# Patient Record
Sex: FEMALE | Race: WHITE | HISPANIC OR LATINO | Employment: OTHER | URBAN - METROPOLITAN AREA
[De-identification: names, ages, dates, MRNs, and addresses within clinical notes are randomized per-mention and may not be internally consistent; named-entity substitution may affect disease eponyms.]

---

## 2024-01-30 ENCOUNTER — OFFICE VISIT (OUTPATIENT)
Dept: SPORTS MEDICINE | Facility: CLINIC | Age: 69
End: 2024-01-30
Payer: MEDICARE

## 2024-01-30 VITALS — HEIGHT: 64 IN | WEIGHT: 178 LBS | BODY MASS INDEX: 30.39 KG/M2

## 2024-01-30 DIAGNOSIS — M75.41 SUBACROMIAL IMPINGEMENT OF RIGHT SHOULDER: ICD-10-CM

## 2024-01-30 DIAGNOSIS — Z98.890 H/O REPAIR OF RIGHT ROTATOR CUFF: ICD-10-CM

## 2024-01-30 DIAGNOSIS — G56.01 CARPAL TUNNEL SYNDROME OF RIGHT WRIST: Primary | ICD-10-CM

## 2024-01-30 DIAGNOSIS — M25.511 RIGHT SHOULDER PAIN, UNSPECIFIED CHRONICITY: Primary | ICD-10-CM

## 2024-01-30 PROCEDURE — 99203 OFFICE O/P NEW LOW 30 MIN: CPT | Mod: PBBFAC,PN | Performed by: STUDENT IN AN ORGANIZED HEALTH CARE EDUCATION/TRAINING PROGRAM

## 2024-01-30 PROCEDURE — 97760 ORTHOTIC MGMT&TRAING 1ST ENC: CPT | Mod: PBBFAC,PN | Performed by: STUDENT IN AN ORGANIZED HEALTH CARE EDUCATION/TRAINING PROGRAM

## 2024-01-30 PROCEDURE — 99999 PR PBB SHADOW E&M-NEW PATIENT-LVL III: CPT | Mod: PBBFAC,,, | Performed by: STUDENT IN AN ORGANIZED HEALTH CARE EDUCATION/TRAINING PROGRAM

## 2024-01-30 PROCEDURE — 99204 OFFICE O/P NEW MOD 45 MIN: CPT | Mod: 25,S$PBB,, | Performed by: STUDENT IN AN ORGANIZED HEALTH CARE EDUCATION/TRAINING PROGRAM

## 2024-01-30 RX ORDER — ATORVASTATIN CALCIUM 40 MG/1
1 TABLET, FILM COATED ORAL DAILY
COMMUNITY
Start: 2023-10-02 | End: 2024-03-06 | Stop reason: SDUPTHER

## 2024-01-30 RX ORDER — CHOLECALCIFEROL (VITAMIN D3) 50 MCG
2000 TABLET ORAL DAILY
COMMUNITY
Start: 2023-11-16

## 2024-01-30 RX ORDER — LANOLIN ALCOHOL/MO/W.PET/CERES
1 CREAM (GRAM) TOPICAL DAILY
COMMUNITY
Start: 2023-11-16

## 2024-01-30 RX ORDER — CETIRIZINE HYDROCHLORIDE 10 MG/1
1 TABLET ORAL DAILY
COMMUNITY
Start: 2023-11-16 | End: 2024-03-06 | Stop reason: SDUPTHER

## 2024-01-30 NOTE — PROGRESS NOTES
"        Patient ID: Pamela Lancaster  YOB: 1955  MRN: [id number]    Chief Complaint: Pain of the Right Shoulder    Referred By: Self  for right shoulder    History of Present Illness: Pamela Lancaster is a right-hand dominant 68 y.o. female who presents today with right shoulder pain.     The patient is active in none.  Occupation: Retired    Pamela Lancaster states it is Acute on chronic in nature and there was not a specific mechanism. Prior history of rotator cuff repair with a recent tingling sensation in tips of finger 2-5.   Pamela Lancaster describes the pain as a intermittent ache and numbness. Current pain level at rest is 6/10, pain level at worst is 9/10 (Numeric Pain Rating Scale).  Associated symptoms include: Swelling No, Instability No, Pain that affects your sleep No, Mechanical Yes, locking/catching No, Neurological Yes, limited range of motion No. Aggravating activities include certain positional movements. They have tried  naproxen, topicals so far for this. They believe that they are unchanged with this treatment.     No results found for: "HGBA1C"    Past Medical History:   History reviewed. No pertinent past medical history.  Past Surgical History:   Procedure Laterality Date    JOINT REPLACEMENT       Family History   Problem Relation Age of Onset    No Known Problems Mother     No Known Problems Father      Social History     Socioeconomic History    Marital status:    Tobacco Use    Smoking status: Never    Smokeless tobacco: Never   Substance and Sexual Activity    Alcohol use: Never    Drug use: Never    Sexual activity: Not Currently     Medication List with Changes/Refills   Current Medications    ATORVASTATIN (LIPITOR) 40 MG TABLET    Take 1 tablet by mouth once daily.    CETIRIZINE (ZYRTEC) 10 MG TABLET    Take 1 tablet by mouth once daily.    CHOLECALCIFEROL, VITAMIN D3, (VITAMIN D3) 50 MCG (2,000 UNIT) TAB    Take 2,000 Units by mouth once daily.    CYANOCOBALAMIN (VITAMIN B-12) " 1000 MCG TABLET    Take 1 tablet by mouth once daily.     Review of patient's allergies indicates:   Allergen Reactions    Iodine     Shellfish containing products        Physical Exam:   Body mass index is 30.55 kg/m².    GENERAL: Well appearing, in no acute distress.  HEAD: Normocephalic and atraumatic.  ENT: External ears and nose grossly normal.  EYES: EOMI bilaterally  PULMONARY: Respirations are grossly even and non-labored.  NEURO: Awake, alert, and oriented x 3.  SKIN: No obvious rashes appreciated.  PSYCH: Mood & affect are appropriate.    Detailed MSK exam:     Full cervical ROM, tightness with flexion. Spurling's negative. Negative tenderness, negative increased tissue density.     Shoulder ROM- full, negative drop arm. IR to T12 bilaterally.   Resisted strength-   Flexion 5/5, ER 5/5, IR 5/5, extension 5/5    Tenderness to palpation: AC negative, long head biceps tendon negative, anterior capsule negative, posterior capsule negative, biceps negative, rotator cuff negative, subacromial space positive    Neers negative, Estrada Estevan in scaption negative, Estrada Estevan in cross body negative, Cross-body adduction negative, resisted extension negative, Obriens negative, Speeds negative, Empty can (resisted scaption) negative, Full can (resisted scaption) negative, resisted abduction negative, Belly press negative, lift-off negative,     Right hand: No obvious deformities, no ecchymosis, no erythema. No effusion. Full ROM. Carpal tunnel tinel sign positive. Carpal compression positive. Wrist flexion overpressure 30sec negative. Wrist extension over pressure 60sec negative. Cubital tunnel negative.     N/V Exam:  Radial: normal motor (EPL/thumbs up)              normal sensory (dorsal hand)   Median: normal motor (FPL/A-OK)      normal sensory (thumb)   Ulnar:  normal motor (Interossei/scissors-spread)     normal sensory (5th finger)   LABC: normal sensory (lateral forearm)   MABC: normal sensory (medial  forearm)   MC: normal motor (elbow flexion)   Axillary: normal motor/sensory (deltoid)  normal radial and ulnar pulses, warm and well perfused with capillary refill < 2 sec     Imaging:  X-ray Shoulder 2 or More Views Right  Narrative: EXAMINATION:  XR SHOULDER COMPLETE 2 OR MORE VIEWS RIGHT    CLINICAL HISTORY:  Pain in right shoulder    TECHNIQUE:  Two or three views of the right shoulder were preformed.    COMPARISON:  None    FINDINGS:  No acute fracture or dislocation.  Mild AC joint arthropathy is noted.  No significant degenerative change at the glenohumeral joint.  Suture anchor noted within the humeral head.  Impression: 1.  As above    Electronically signed by: South Gutiérrez DO  Date:    01/30/2024  Time:    13:35      Relevant imaging results were reviewed and interpreted by me and per my read as above.  This was discussed with the patient and / or family today.     Assessment:  Pamela Lancaster is a 68 y.o. female presents today for right shoulder pain as well as right hand numbness.  Has some clinical signs and symptoms today of carpal tunnel.  No signs of cubital tunnel.  Her biggest complaint is numbness and tingling at the tips of her fingers with certain positions and usually improves with straightening out her arm and wrist.  Discussed with the clinical findings carpal tunnel starting with carpal tunnel splinting at night as well as topicals and continue naproxen.  PT ordered today to work on right shoulder as well as carpal tunnel.  Right shoulder more consistent with some impingement previous remote rotator cuff repair good strength today no gross signs of impingement but symptomology and area of pain more congruent with that diagnosis.  Discussed moving forward with formal therapy and following up as needed.  Consider EMG or diagnostic steroid injections in the future.    Carpal tunnel syndrome of right wrist  -     Ambulatory referral/consult to Physical/Occupational Therapy; Future; Expected date:  02/06/2024    Subacromial impingement of right shoulder  -     Ambulatory referral/consult to Physical/Occupational Therapy; Future; Expected date: 02/06/2024    H/O repair of right rotator cuff    At least 10 minutes were spent sizing, fitting, and educating for durable medical equipment application today.  CPT 68279.       A copy of today's visit note has been sent to the referring provider.       Sixto Liriano MD    Disclaimer: This note was prepared using a voice recognition system and is likely to have sound alike errors within the text.

## 2024-01-30 NOTE — PATIENT INSTRUCTIONS
Assessment:  Pamela Lancaster is a 68 y.o. female   Chief Complaint   Patient presents with    Right Shoulder - Pain       Encounter Diagnoses   Name Primary?    Carpal tunnel syndrome of right wrist Yes    Subacromial impingement of right shoulder     H/O repair of right rotator cuff         Plan:  Reviewed your x-rays with you today and discussed pertinent findings.   Wrist brace at night. At least 9 minutes were spent sizing, fitting, and educating regarding durable medical equipment today and all questions answered. This service was performed under direction of Sixto Liriano MD today.  CPT 60110.  Formal therapy for right shoulder and right hand. An ambulatory referral to physical therapy was placed within the [facility] system today. You should expect a phone call within the next few days from Centralized Scheduling. If you do not hear from them, please reach out to the PT department directly at [phone].    Apply topical diclofenac (Voltaren) up to 4 times a day to the affected area.  It can be bought over the counter at any local pharmacy.      Follow-up: 6 weeks or sooner if there are any problems between now and then.    Thank you for choosing [facility] and Dr. Sixto Liriano for your orthopedic & sports medicine care. It is our goal to provide you with exceptional care that will help keep you healthy, active, and get you back in the game.    Please do not hesitate to reach out to us via email, phone, or MyChart with any questions, concerns, or feedback.    If you felt that you received exemplary care today, please consider leaving us feedback on Healthgrades at:  [email or web address]    If you are experiencing pain/discomfort ,or have questions after 5pm and would like to be connected to the [facility] on-call team, please call this number and specify which Sports Medicine provider is treating you: [phone]

## 2024-02-05 ENCOUNTER — CLINICAL SUPPORT (OUTPATIENT)
Dept: REHABILITATION | Facility: HOSPITAL | Age: 69
End: 2024-02-05
Attending: STUDENT IN AN ORGANIZED HEALTH CARE EDUCATION/TRAINING PROGRAM
Payer: MEDICARE

## 2024-02-05 DIAGNOSIS — M75.41 SUBACROMIAL IMPINGEMENT OF RIGHT SHOULDER: ICD-10-CM

## 2024-02-05 DIAGNOSIS — R20.2 RIGHT HAND PARESTHESIA: ICD-10-CM

## 2024-02-05 DIAGNOSIS — M25.511 ACUTE PAIN OF RIGHT SHOULDER: Primary | ICD-10-CM

## 2024-02-05 DIAGNOSIS — G56.01 CARPAL TUNNEL SYNDROME OF RIGHT WRIST: ICD-10-CM

## 2024-02-05 PROCEDURE — 97110 THERAPEUTIC EXERCISES: CPT | Mod: PN

## 2024-02-05 PROCEDURE — 97535 SELF CARE MNGMENT TRAINING: CPT | Mod: PN

## 2024-02-05 PROCEDURE — 97165 OT EVAL LOW COMPLEX 30 MIN: CPT | Mod: PN

## 2024-02-05 NOTE — PLAN OF CARE
[facility]  Occupational Therapy Initial Evaluation     Date: 2/5/2024  Name: Pamela Lancaster  Clinic Number: [id number]    Therapy Diagnosis:   Encounter Diagnoses   Name Primary?    Carpal tunnel syndrome of right wrist     Subacromial impingement of right shoulder     Acute pain of right shoulder Yes    Right hand paresthesia      Physician: Sixto Liriano MD    Physician Orders: OT eval and tx  Medical Diagnosis: Carpal tunnel syndrome of right wrist [G56.01], Subacromial impingement of right shoulder [M75.41]   Evaluation Date: 2/5/2024  Insurance Authorization Period Expiration: 1/29/2025  Plan of Care Certification Period: 2/5/2024 to 3/15/2024  Progress Note Due: 30 days     Visit # / Visits authorized: 1/1  FOTO: 1/3    Surgical Procedure: none new; hx of right rotator cuff repair     Date of Return to MD: PRN    Precautions:  Standard    Time In: 0800  Time Out: 0850  Total Appointment Time (timed & untimed codes): 10 minutes    Subjective     Involved Side: right  Dominant Side: Right    Date of Onset: ~ 2 months ago  Mechanism of Injury/ History of Current Condition: Pt reports pain in her right shoulder blade that radiates down her back and under her arm. She also reports tingling radiating down her arm into her hand. She reports that she noticed symptoms ~ 2 months ago. She reports hx of right rotator cuff repair over 20 years ago. She reports that her pain is intermittent and that she was given a wrist brace that she wears at night. She reports tingling in just the tips of her fingers, but reports the tingling starts when her elbow is bent.    Falls: none    Per MD Notes on 1/30/2024: Pamela Lancaster states it is Acute on chronic in nature and there was not a specific mechanism. Prior history of rotator cuff repair with a recent tingling sensation in tips of finger 2-5.   Pamela Lancaster describes the pain as a intermittent ache and numbness. Current pain level at rest is 6/10,  "pain level at worst is 9/10 (Numeric Pain Rating Scale).  Associated symptoms include: Swelling No, Instability No, Pain that affects your sleep No, Mechanical Yes, locking/catching No, Neurological Yes, limited range of motion No. Aggravating activities include certain positional movements. They have tried  naproxen, topicals so far for this. They believe that they are unchanged with this treatment.   Has some clinical signs and symptoms today of carpal tunnel.  No signs of cubital tunnel.  Her biggest complaint is numbness and tingling at the tips of her fingers with certain positions and usually improves with straightening out her arm and wrist.  Discussed with the clinical findings carpal tunnel starting with carpal tunnel splinting at night as well as topicals and continue naproxen.  PT ordered today to work on right shoulder as well as carpal tunnel.  Right shoulder more consistent with some impingement previous remote rotator cuff repair good strength today no gross signs of impingement but symptomology and area of pain more congruent with that diagnosis.  Discussed moving forward with formal therapy and following up as needed.  Consider EMG or diagnostic steroid injections in the future.     Imaging: X-ray of right shoulder on 1/30/2024: No acute fracture or dislocation.  Mild AC joint arthropathy is noted.  No significant degenerative change at the glenohumeral joint.  Suture anchor noted within the humeral head.     Previous Therapy: none    Patient's Goals for Therapy: "decrease my pain and tingling"    Pain:  Functional Pain Scale Rating 0-10:   5/10 on average  n/a/10 at best  7/10 at worst  Location: posterior right shoulder and right hand  Description: Aching - shoulder, Tingling - hand  Aggravating Factors: Bending and Lifting  Easing Factors: heating pad and rest    Functional Limitations/Social History:    Previous Functional Status: Independent with all ADL/IADL tasks     Current Functional " Status: right shoulder pain and right hand tingling limits her sleep and tolerance for lifting    Home/Living environment: lives with her spouse     Driving: yes    Occupation: retired  Working presently: home-maker  Duties: minimal - light household chores        Past Medical History/Physical Systems Review:   Medical History:   No past medical history on file.    Surgical History:    has a past surgical history that includes Joint replacement.    Medications:   has a current medication list which includes the following prescription(s): atorvastatin, cetirizine, cholecalciferol (vitamin d3), and cyanocobalamin.    Allergies:   Review of patient's allergies indicates:   Allergen Reactions    Iodine     Shellfish containing products           Objective     Postural Screening: rounded shoulders    Cervical Screening: pt reported pulling with cervical flexion and right rotation; decreased lateral flexion bilaterally  Spurling's Test: right - negative, left  - negative    Sensation: Pt denies hypersensitivity. Intact to light touch in right upper extremity. Paresthesias reported in tips of fingers of right hand.      bilateral Upper Extremity Active Range of Motion: right upper extremity is WNL as compared to left upper extremity   Pt reported no pain or pulling with right upper extremity ROM  Pt reported no right shoulder pain to touch/palpation      right Hand Active Range of Motion: WNL as compared to left hand    Manual Muscles Test:   Right Left   Strength 2/5/2024 2/5/2024   Shoulder flexion 4+/5 5/5   Shoulder abduction 4+/5 5/5   Shoulder internal rotation  (Shoulder adducted at side) 4+/5 5/5   Shoulder external rotation  (Shoulder adducted at side) 4+/5 5/5   Elbow flexion 4+/5 5/5   Elbow extension 4+/5 5/5   Pt noted to elevate scapula with resisted shoulder strength testing                                       and Pinch Strength (in pounds, psi's):   Right Left    2/5/2024 2/5/2024    II 40 35    Lateral 12 11   Tripod 10 12   Tip 6 7        Intake Outcome Measure for FOTO Shoulder Survey    Therapist reviewed FOTO scores for Pamela Lancatser on 2/5/2024.   FOTO documents entered into [email or web address] - see Media section.    Intake Score: 60%     Predicted Functional Score: 70%    Treatment     Total Treatment time (time-based codes) separate from Evaluation: 25 minutes    Pamela received the treatments listed below:      therapeutic exercises to develop strength, ROM, and posture for 15 minutes including:  - HEP    self-care techniques to improve independence and safety with ADL/IADL tasks for 10 minutes including:  - precautions for carpal tunnel syndrome  - anatomy of nerves in the upper extremity  - postural awareness    Home Exercise Program/Education:    Education provided:   - Role of OT, goals for OT, scheduling/cancellations, therapy attendance policy    Written Home Exercises Provided: Yes  Exercises were reviewed and Pamela was able to demonstrate them prior to the end of the session. Pamela demonstrated good understanding of the education provided. See EMR under Patient Instructions for exercises provided during therapy sessions.     Pt was advised to perform these exercises free of pain, and to stop performing them if pain occurs.    Assessment     Pamela Lancaster is a 68 y.o. female referred to outpatient occupational therapy and presents with a medical diagnosis of Carpal tunnel syndrome of right wrist [G56.01], Subacromial impingement of right shoulder [M75.41] .  Patient presents with the following therapy deficits: Decreased muscle strength, Increased pain, and Diminished/Impaired Sensation and demonstrates limitations as described in the chart below.     Following medical record review, it is determined that the pt will benefit from occupational therapy services in order to maximize pain free and/or functional use of her right UE. The following goals were discussed with the patient and patient is in agreement  with them as to be addressed in the treatment plan. The patient's rehab potential is Good.     Anticipated barriers to occupational therapy: none  Pt has no cultural, educational or language barriers to learning provided.    Medical Necessity is demonstrated by the following  Occupational Profile/History  Co-morbidities and personal factors that may impact the plan of care [x] LOW: Brief chart review  [] MODERATE: Expanded chart review   [] HIGH: Extensive chart review    Moderate / High Support Documentation: n/a     Examination  Performance deficits relating to physical, cognitive or psychosocial skills that result in activity limitations and/or participation restrictions  [] LOW: addressing 1-3 Performance deficits  [x] MODERATE: 3-5 Performance deficits  [] HIGH: 5+ Performance deficits (please support below)    Moderate / High Support Documentation:    Physical:  Muscle Power/Strength  Muscle Endurance  Proprioception Functions  Tactile Functions  Postural Control  Pain    Cognitive:  Safety Awareness/Insight to Disability    Psychosocial:    Habits  Routines     Treatment Options [x] LOW: Limited options  [] MODERATE: Several options  [] HIGH: Multiple options      Decision Making/ Complexity Score: low       The following goals were discussed with the patient and patient is in agreement with them as to be addressed in the treatment plan.     Goals:   Short Term Goals: (4 weeks)  1. Pt will be independent with HEP in 2 visits.  2. Pt will report decreased right shoulder pain to a 3-4/10 with ADL/IADL tasks.   3. Pt will report  frequency or intensity of right hand paresthesia.  4. Pt will be independent with R wrist-supporting brace wear/care schedule.    Long Term Goals: (8 weeks)  1. Pt will report decreased right shoulder pain to 1-2/10 with ADL/IADL tasks.   2. Pt will report resolution of right hand paresthesia.   3. Pt will be independent with self-management of future exacerbations.   4. Pt will  exhibit improved postural awareness and decreased scapula elevation during ADL/IADL tasks.  5. Pt will report an increase in FOTO intake score of > 65%, which would indicate an improvement in quality of life.      Plan   Plan of Care Certification: 2/5/2024 to 3/15/2024     Outpatient Occupational Therapy 2 times weekly for 6 weeks to include the following interventions PRN: Electrical Stimulation TENS, Fluidotherapy, Manual Therapy, Moist Heat/ Ice, Neuromuscular Re-ed, Orthotic Management and Training, Paraffin, Patient Education, Self Care, Therapeutic Activities, Therapeutic Exercise, and Ultrasound      DIGNA Graham, SALVATORE, TONNYT

## 2024-02-12 ENCOUNTER — CLINICAL SUPPORT (OUTPATIENT)
Dept: REHABILITATION | Facility: HOSPITAL | Age: 69
End: 2024-02-12
Payer: MEDICARE

## 2024-02-12 DIAGNOSIS — R20.2 RIGHT HAND PARESTHESIA: ICD-10-CM

## 2024-02-12 DIAGNOSIS — M25.511 ACUTE PAIN OF RIGHT SHOULDER: Primary | ICD-10-CM

## 2024-02-12 PROCEDURE — 97110 THERAPEUTIC EXERCISES: CPT | Mod: PN

## 2024-02-12 PROCEDURE — 97112 NEUROMUSCULAR REEDUCATION: CPT | Mod: PN

## 2024-02-12 NOTE — PROGRESS NOTES
"  [facility]  Occupational Therapy Treatment Note     Date: 2/12/2024  Name: Pamela Lancaster  Clinic Number: [id number]    Therapy Diagnosis:   Encounter Diagnoses   Name Primary?    Acute pain of right shoulder Yes    Right hand paresthesia      Physician: Sixto Liriano MD    Physician Orders: OT eval and tx  Medical Diagnosis: Carpal tunnel syndrome of right wrist [G56.01], Subacromial impingement of right shoulder [M75.41]   Evaluation Date: 2/5/2024  Insurance Authorization Period Expiration: 1/30/2026  Plan of Care Certification Period: 2/5/2024 to 3/15/2024  Progress Note Due: 30 days      Visit # / Visits authorized: 1/20  FOTO: 1/3     Surgical Procedure: none new; hx of right rotator cuff repair                                   Date of Return to MD: PRN     Precautions:  Standard    Time In: 0820  Time Out: 0905  Total Billable Time: 45 minutes    Subjective     Pt reports: "I'm doing ok. My pain varies day to day."    she was compliant with home exercise program given on evaluation.  Response to previous treatment: good  Functional change: decreased pain    Pain: 4/10 (5/10 on evaluation)  Location: posterior right shoulder and right hand     Objective   Objective measures updated at progress report unless specified.    Treatment     Pamela received the treatments listed below:      therapeutic exercises to develop strength, endurance, ROM, and flexibility of RUE for 25 minutes including:  Reviewed HEP:  - median nerve glides 3x10 (2 ways)  - right shoulder vertical abduction (palm up) 2x10  - UBE x 6 min for bilateral upper extremity strengthening and endurance  - TGE's x 20 reps    manual therapy techniques were applied to RUE for 5 minutes including:  - therapeutic cupping right carpal tunnel to decrease compression and increase circulation    neuromuscular re-education activities to improve Posture for 20 minutes including:  Reviewed HEP:  - wall postures with bilateral " shoulder ER 3x10  - bilateral scap retraction 3x10  - chin tucks 3x10    therapeutic activities to improve functional performance of RUE for 0 minutes including:  - NT    direct contact modalities after being cleared for contraindications for 0 minutes including:  - NT    supervised modalities after being cleared for contradictions for 0 minutes including:  - NT    Home Exercises and Education Provided     Education provided:   - reviewed HEP issued at evaluation  - progress toward goals    Written Home Exercises Provided: Patient instructed to cont prior HEP  Exercises were reviewed and Pamela was able to demonstrate them prior to the end of the session. Pamela demonstrated good understanding of the HEP provided.     See EMR under Patient Instructions for exercises provided during prior visit.        Assessment     Pamela would continue to benefit from skilled OT. She was seen for her first tx session on this date. She has been very compliant with her HEP and reported a slight decrease in pain upon arrival. Postural exercises and nerve glides caused an increase in her right hand paresthesia to a point of irritation. Educated pt on how numbness indicates nerve compression and how paresthesia could indicate that the nerve is becoming decompressed. Tx terminated early 2* irritated symptoms but overall good participation.     Pamela is progressing towards her goals and there are no updates to goals at this time. Pt prognosis is Good.     Pt will continue to benefit from skilled outpatient occupational therapy to address the deficits listed in the problem list on initial evaluation provide pt/family education and to maximize pt's level of independence in the home and community environment.     Pt's spiritual, cultural and educational needs considered and pt agreeable to plan of care and goals.    Anticipated barriers to occupational therapy: none    Goals:  Short Term Goals: (4 weeks)  1. Pt will be independent with HEP in  2 visits.  2. Pt will report decreased right shoulder pain to a 3-4/10 with ADL/IADL tasks.   3. Pt will report  frequency or intensity of right hand paresthesia.  4. Pt will be independent with R wrist-supporting brace wear/care schedule.     Long Term Goals: (8 weeks)  1. Pt will report decreased right shoulder pain to 1-2/10 with ADL/IADL tasks.   2. Pt will report resolution of right hand paresthesia.   3. Pt will be independent with self-management of future exacerbations.   4. Pt will exhibit improved postural awareness and decreased scapula elevation during ADL/IADL tasks.  5. Pt will report an increase in FOTO intake score of > 65%, which would indicate an improvement in quality of life.    Plan     Plan of Care Certification: 2024 to 3/15/2024      Outpatient Occupational Therapy 2 times weekly for 6 weeks to include the following interventions PRN: Electrical Stimulation TENS, Fluidotherapy, Manual Therapy, Moist Heat/ Ice, Neuromuscular Re-ed, Orthotic Management and Training, Paraffin, Patient Education, Self Care, Therapeutic Activities, Therapeutic Exercise, and Ultrasound    Updates/Grading for next session: progress occupational therapy as tolerated    DIGNA Graham, SALVATORE, CHT

## 2024-02-15 ENCOUNTER — CLINICAL SUPPORT (OUTPATIENT)
Dept: REHABILITATION | Facility: HOSPITAL | Age: 69
End: 2024-02-15
Payer: MEDICARE

## 2024-02-15 DIAGNOSIS — M25.511 ACUTE PAIN OF RIGHT SHOULDER: Primary | ICD-10-CM

## 2024-02-15 DIAGNOSIS — R20.2 RIGHT HAND PARESTHESIA: ICD-10-CM

## 2024-02-15 PROCEDURE — 97112 NEUROMUSCULAR REEDUCATION: CPT | Mod: PN

## 2024-02-15 PROCEDURE — 97110 THERAPEUTIC EXERCISES: CPT | Mod: PN

## 2024-02-15 NOTE — PROGRESS NOTES
"  [facility]  Occupational Therapy Treatment Note     Date: 2/15/2024  Name: Pamela Lancaster  Clinic Number: [id number]    Therapy Diagnosis:   Encounter Diagnoses   Name Primary?    Acute pain of right shoulder Yes    Right hand paresthesia      Physician: Sixto Liriano MD    Physician Orders: OT eval and tx  Medical Diagnosis: Carpal tunnel syndrome of right wrist [G56.01], Subacromial impingement of right shoulder [M75.41]   Evaluation Date: 2/5/2024  Insurance Authorization Period Expiration: 1/30/2026  Plan of Care Certification Period: 2/5/2024 to 3/15/2024  Progress Note Due: 30 days      Visit # / Visits authorized: 2/20  FOTO: 1/3     Surgical Procedure: none new; hx of right rotator cuff repair                                   Date of Return to MD: PRN     Precautions:  Standard    Time In: 0657  Time Out: 0755  Total Billable Time: 55 minutes    Subjective     Pt reports: "I'm a little sore today."    she was compliant with home exercise program given on evaluation.  Response to previous treatment: good  Functional change: decreased right upper extremity paresthesia during session    Pain: 4/10 (5/10 on evaluation)  Location: posterior right shoulder and right hand     Objective   Objective measures updated at progress report unless specified.    Treatment     Pamela received the treatments listed below:      therapeutic exercises to develop strength, endurance, ROM, and flexibility of RUE for 35 minutes including:  - UBE x 6 min for bilateral upper extremity strengthening and endurance  - scapula stability exercises: supine serratus punches and ABC's with 1# DB  - sidelying ER (shld adducted, elbow bent at 90*) with 1# DB 3x10  - prone T's: 3x8 with min A to maintain retracted and depressed scapula  - median nerve flossing 2x10   - resisted seated bilateral ER "W's" with red theraband  - thumb AROM: radial and palmar abd/add, circumduction (both directions), opposition to " "tabletop, IP flexion, and lifts x 10 reps each  - wrist flexor and extensor stretching (with extended elbow) x 30 sec hold each    manual therapy techniques were applied to RUE for 5 minutes including:  - therapeutic cupping right carpal tunnel to decrease compression and increase circulation    neuromuscular re-education activities to improve Posture for 10 minutes including:  - seated bilateral scap retraction AROM 2x10  - prone right scap retraction AROM 2x10  - prone chin tucks 2x10    therapeutic activities to improve functional performance of RUE for 4 minutes including:  - composite  strengthening with green digiciser 3x10    direct contact modalities after being cleared for contraindications for 0 minutes including:  - NT    supervised modalities after being cleared for contradictions for 0 minutes including:  - NT    Home Exercises and Education Provided     Education provided:   - progress toward goals    Written Home Exercises Provided: Patient instructed to cont prior HEP  Exercises were reviewed and Pamela was able to demonstrate them prior to the end of the session. Pamela demonstrated good understanding of the HEP provided.     See EMR under Patient Instructions for exercises provided during prior visit.        Assessment     Pamela would continue to benefit from skilled OT. She was seen for her 2nd tx session on this date. She tolerated session better today with decreased paresthesia. She requires cueing to not "hike" her right shoulder and to depress her scapula shoulder.    Pamela is progressing towards her goals and there are no updates to goals at this time. Pt prognosis is Good.     Pt will continue to benefit from skilled outpatient occupational therapy to address the deficits listed in the problem list on initial evaluation provide pt/family education and to maximize pt's level of independence in the home and community environment.     Pt's spiritual, cultural and educational needs considered " and pt agreeable to plan of care and goals.    Anticipated barriers to occupational therapy: none    Goals:  Short Term Goals: (4 weeks)  1. Pt will be independent with HEP in 2 visits. - MET 2/15/2024  2. Pt will report decreased right shoulder pain to a 3-4/10 with ADL/IADL tasks.   3. Pt will report  frequency or intensity of right hand paresthesia.  4. Pt will be independent with R wrist-supporting brace wear/care schedule.     Long Term Goals: (8 weeks)  1. Pt will report decreased right shoulder pain to 1-2/10 with ADL/IADL tasks.   2. Pt will report resolution of right hand paresthesia.   3. Pt will be independent with self-management of future exacerbations.   4. Pt will exhibit improved postural awareness and decreased scapula elevation during ADL/IADL tasks.  5. Pt will report an increase in FOTO intake score of > 65%, which would indicate an improvement in quality of life.    Plan     Plan of Care Certification: 2024 to 3/15/2024      Outpatient Occupational Therapy 2 times weekly for 6 weeks to include the following interventions PRN: Electrical Stimulation TENS, Fluidotherapy, Manual Therapy, Moist Heat/ Ice, Neuromuscular Re-ed, Orthotic Management and Training, Paraffin, Patient Education, Self Care, Therapeutic Activities, Therapeutic Exercise, and Ultrasound    Updates/Grading for next session: progress occupational therapy as tolerated    DIGNA Graham, SALVATORE, TONNYT

## 2024-02-19 ENCOUNTER — CLINICAL SUPPORT (OUTPATIENT)
Dept: REHABILITATION | Facility: HOSPITAL | Age: 69
End: 2024-02-19
Payer: MEDICARE

## 2024-02-19 DIAGNOSIS — R20.2 RIGHT HAND PARESTHESIA: ICD-10-CM

## 2024-02-19 DIAGNOSIS — M25.511 ACUTE PAIN OF RIGHT SHOULDER: Primary | ICD-10-CM

## 2024-02-19 PROCEDURE — 97112 NEUROMUSCULAR REEDUCATION: CPT | Mod: PN

## 2024-02-19 PROCEDURE — 97110 THERAPEUTIC EXERCISES: CPT | Mod: PN

## 2024-02-19 NOTE — PROGRESS NOTES
"  [facility]  Occupational Therapy Treatment Note     Date: 2/19/2024  Name: Pamela Lancaster  Clinic Number: [id number]    Therapy Diagnosis:   Encounter Diagnoses   Name Primary?    Acute pain of right shoulder Yes    Right hand paresthesia      Physician: Sixto Liriano MD    Physician Orders: OT eval and tx  Medical Diagnosis: Carpal tunnel syndrome of right wrist [G56.01], Subacromial impingement of right shoulder [M75.41]   Evaluation Date: 2/5/2024  Insurance Authorization Period Expiration: 1/30/2026  Plan of Care Certification Period: 2/5/2024 to 3/15/2024  Progress Note Due: ~ 3/5/2024     Visit # / Visits authorized: 3/20  FOTO: 1/3     Surgical Procedure: none new; hx of right rotator cuff repair                                   Date of Return to MD: PRN     Precautions:  Standard    Time In: 0750  Time Out: 0850  Total Billable Time: 60 minutes    Subjective     Pt reports: "Last night, I had a shooting pain from my forearm to my hand. It woke me up."    she was compliant with home exercise program given on evaluation.  Response to previous treatment: good  Functional change: improved postural awareness and correction    Pain: 4/10 (5/10 on evaluation)  Location: posterior right shoulder and right hand     Objective   Objective measures updated at progress report unless specified.    Treatment     Pamela received the treatments listed below:      therapeutic exercises to develop strength, endurance, ROM, and flexibility of RUE for 40 minutes including:  - UBE x 6 min for bilateral upper extremity strengthening and endurance  - resisted scap retractions/lat pulls with black resistive band on rig 2x10 with min A  - resisted scap retractions/lat pull downs with black resistive band on rig 2x20  - scapula stability exercises: supine/reclined serratus punches and ABC's with 2# DB (shld flexed at 120*)  - sidelying ER (shld adducted, elbow bent at 90*) with 2# DB 3x10  - prone " "T's: 2x10  - resisted seated bilateral ER "W's" with red theraband 3x10  - median nerve flossing 2x10   - thumb AROM: radial and palmar abd/add, circumduction (both directions), opposition to tabletop, IP flexion, and lifts x 10 reps each  - wrist flexor and extensor stretching (with extended elbow) x 30 sec holds (after IASTM and cupping)  - wrist AROM with 2# DB, forearm 3 positions over bolster, x 20 reps each  - TGE's x 20 reps    manual therapy techniques were applied to RUE for 10 minutes including:  - therapeutic cupping right carpal tunnel to decrease compression and increase circulation  - IASTM over proximal dorsal forearm to decrease pain and compression and increase circulation    neuromuscular re-education activities to improve Posture for 10 minutes including:  - seated bilateral scap retraction AROM 2x10  - chin tucks 2x10 with min A    therapeutic activities to improve functional performance of RUE for 0 minutes including:    direct contact modalities after being cleared for contraindications for 0 minutes including:  - NT    supervised modalities after being cleared for contradictions for 0 minutes including:  - NT    Home Exercises and Education Provided     Education provided:   - progress toward goals    Written Home Exercises Provided: Patient instructed to cont prior HEP  Exercises were reviewed and Pamela was able to demonstrate them prior to the end of the session. Pamela demonstrated good understanding of the HEP provided.     See EMR under Patient Instructions for exercises provided during prior visit.        Assessment     Pamela would continue to benefit from skilled OT. She was seen for her 3rd tx session on this date. She continue to require cueing to not "hike" her shoulder but exhibits improved awareness and able to self-correct better. She is exhibiting improved scapular mobility. She indicated shooting/electrical pain in dorsal forearm upon arrival - possible DSRN compression. " Introduced IASTM and she tolerated well.    Pamela is progressing towards her goals and there are no updates to goals at this time. Pt prognosis is Good.     Pt will continue to benefit from skilled outpatient occupational therapy to address the deficits listed in the problem list on initial evaluation provide pt/family education and to maximize pt's level of independence in the home and community environment.     Pt's spiritual, cultural and educational needs considered and pt agreeable to plan of care and goals.    Anticipated barriers to occupational therapy: none    Goals:  Short Term Goals: (4 weeks)  1. Pt will be independent with HEP in 2 visits. - MET 2/15/2024  2. Pt will report decreased right shoulder pain to a 3-4/10 with ADL/IADL tasks. - MET 2024   3. Pt will report  frequency or intensity of right hand paresthesia.  4. Pt will be independent with R wrist-supporting brace wear/care schedule.     Long Term Goals: (8 weeks)  1. Pt will report decreased right shoulder pain to 1-2/10 with ADL/IADL tasks.   2. Pt will report resolution of right hand paresthesia.   3. Pt will be independent with self-management of future exacerbations.   4. Pt will exhibit improved postural awareness and decreased scapula elevation during ADL/IADL tasks.  5. Pt will report an increase in FOTO intake score of > 65%, which would indicate an improvement in quality of life.    Plan     Plan of Care Certification: 2024 to 3/15/2024      Outpatient Occupational Therapy 2 times weekly for 6 weeks to include the following interventions PRN: Electrical Stimulation TENS, Fluidotherapy, Manual Therapy, Moist Heat/ Ice, Neuromuscular Re-ed, Orthotic Management and Training, Paraffin, Patient Education, Self Care, Therapeutic Activities, Therapeutic Exercise, and Ultrasound    Updates/Grading for next session: progress occupational therapy as tolerated    DIGNA Graham, SALVATORE, CHT

## 2024-02-22 ENCOUNTER — CLINICAL SUPPORT (OUTPATIENT)
Dept: REHABILITATION | Facility: HOSPITAL | Age: 69
End: 2024-02-22
Payer: MEDICARE

## 2024-02-22 DIAGNOSIS — M25.511 ACUTE PAIN OF RIGHT SHOULDER: Primary | ICD-10-CM

## 2024-02-22 DIAGNOSIS — R20.2 RIGHT HAND PARESTHESIA: ICD-10-CM

## 2024-02-22 PROCEDURE — 97110 THERAPEUTIC EXERCISES: CPT | Mod: PN | Performed by: OCCUPATIONAL THERAPIST

## 2024-02-22 PROCEDURE — 97140 MANUAL THERAPY 1/> REGIONS: CPT | Mod: PN | Performed by: OCCUPATIONAL THERAPIST

## 2024-02-22 NOTE — PROGRESS NOTES
[facility]  Occupational Therapy Treatment Note     Date: 2/22/2024  Name: Pamela Lancaster  Clinic Number: [id number]    Therapy Diagnosis:   Encounter Diagnoses   Name Primary?    Acute pain of right shoulder Yes    Right hand paresthesia      Physician: Sixto Liriano MD    Physician Orders: OT eval and tx  Medical Diagnosis: Carpal tunnel syndrome of right wrist [G56.01], Subacromial impingement of right shoulder [M75.41]   Evaluation Date: 2/5/2024  Insurance Authorization Period Expiration: 1/30/2026  Plan of Care Certification Period: 2/5/2024 to 3/15/2024  Progress Note Due: ~ 3/5/2024     Visit # / Visits authorized: 4/20  FOTO: 1/3     Surgical Procedure: none new; hx of right rotator cuff repair                                   Date of Return to MD: PRN     Precautions:  Standard    Time In: 0645  Time Out: 0745  Total Billable Time: 60 minutes    Subjective     Pt reports: no pain today, but she does have intermittent pain from weather changes in her right shoulder.    she was compliant with home exercise program given on evaluation.  Response to previous treatment: good  Functional change: improved postural awareness and correction    Pain: 0/10 (5/10 on evaluation)  Location: posterior right shoulder and right hand     Objective   Objective measures updated at progress report unless specified.    Treatment     Pamela received the treatments listed below:      therapeutic exercises to develop strength, endurance, ROM, and flexibility of RUE for 40 minutes including:  - UBE x 6 min for bilateral upper extremity strengthening and endurance  - resisted scap retractions/lat pulls with black resistive band on rig 2x10 with min A  - resisted scap retractions/lat pull downs with black resistive band on rig 2x20  - scapula stability exercises: supine/reclined serratus punches and ABC's with 2# DB (shld flexed at 120*)  - sidelying ER (shld adducted, elbow bent at 90*) with 2# DB  "3x10  - prone T's: 2x10  - resisted seated bilateral ER "W's" with red theraband 3x10  - median nerve flossing 2x10   - thumb AROM: radial and palmar abd/add, circumduction (both directions), opposition to tabletop, IP flexion, and lifts x 10 reps each  - wrist flexor and extensor stretching (with extended elbow) x 30 sec holds (after IASTM and cupping)  - wrist AROM with 2# DB, forearm 3 positions over bolster, x 20 reps each  -reverse curls with 2# DB (elbow flexion with forearm pronated) x20  - TGE's x 20 reps    manual therapy techniques were applied to RUE for 10 minutes including:  - therapeutic cupping right carpal tunnel to decrease compression and increase circulation  - IASTM over proximal dorsal forearm to decrease pain and compression and increase circulation    neuromuscular re-education activities to improve Posture for 10 minutes including:  - seated bilateral scap retraction AROM 2x10  - chin tucks 2x10 with min A    therapeutic activities to improve functional performance of RUE for 0 minutes including:    direct contact modalities after being cleared for contraindications for 0 minutes including:  - NT    supervised modalities after being cleared for contradictions for 0 minutes including:  - NT    Home Exercises and Education Provided     Education provided:   - progress toward goals    Written Home Exercises Provided: Patient instructed to cont prior HEP  Exercises were reviewed and Pamela was able to demonstrate them prior to the end of the session. Pamela demonstrated good understanding of the HEP provided.     See EMR under Patient Instructions for exercises provided during prior visit.        Assessment     Pamela would continue to benefit from skilled OT. She was seen for her 4th tx session on this date. She has increased paresthesias with shoulder and hand exercises. IASTM of the lateral and medial epicondyles and flexor and extensor tendons tolerated well.     Pamela is progressing towards her " goals and there are no updates to goals at this time. Pt prognosis is Good.     Pt will continue to benefit from skilled outpatient occupational therapy to address the deficits listed in the problem list on initial evaluation provide pt/family education and to maximize pt's level of independence in the home and community environment.     Pt's spiritual, cultural and educational needs considered and pt agreeable to plan of care and goals.    Anticipated barriers to occupational therapy: none    Goals:  Short Term Goals: (4 weeks)  1. Pt will be independent with HEP in 2 visits. - MET 2/15/2024  2. Pt will report decreased right shoulder pain to a 3-4/10 with ADL/IADL tasks. - MET 2024   3. Pt will report  frequency or intensity of right hand paresthesia.  4. Pt will be independent with R wrist-supporting brace wear/care schedule.     Long Term Goals: (8 weeks)  1. Pt will report decreased right shoulder pain to 1-2/10 with ADL/IADL tasks.   2. Pt will report resolution of right hand paresthesia.   3. Pt will be independent with self-management of future exacerbations.   4. Pt will exhibit improved postural awareness and decreased scapula elevation during ADL/IADL tasks.  5. Pt will report an increase in FOTO intake score of > 65%, which would indicate an improvement in quality of life.    Plan     Plan of Care Certification: 2024 to 3/15/2024      Outpatient Occupational Therapy 2 times weekly for 6 weeks to include the following interventions PRN: Electrical Stimulation TENS, Fluidotherapy, Manual Therapy, Moist Heat/ Ice, Neuromuscular Re-ed, Orthotic Management and Training, Paraffin, Patient Education, Self Care, Therapeutic Activities, Therapeutic Exercise, and Ultrasound    Updates/Grading for next session: progress occupational therapy as tolerated    SALVATORE Granda

## 2024-02-29 ENCOUNTER — CLINICAL SUPPORT (OUTPATIENT)
Dept: REHABILITATION | Facility: HOSPITAL | Age: 69
End: 2024-02-29
Payer: MEDICARE

## 2024-02-29 DIAGNOSIS — M25.511 ACUTE PAIN OF RIGHT SHOULDER: Primary | ICD-10-CM

## 2024-02-29 DIAGNOSIS — R20.2 RIGHT HAND PARESTHESIA: ICD-10-CM

## 2024-02-29 PROCEDURE — 97112 NEUROMUSCULAR REEDUCATION: CPT | Mod: PN

## 2024-02-29 PROCEDURE — 97110 THERAPEUTIC EXERCISES: CPT | Mod: PN

## 2024-02-29 NOTE — PROGRESS NOTES
"  [facility]  Occupational Therapy Treatment Note & Progress Note     Date: 2/29/2024  Name: Pamela Lancaster  Clinic Number: [id number]    Therapy Diagnosis:   Encounter Diagnoses   Name Primary?    Acute pain of right shoulder Yes    Right hand paresthesia      Physician: Sixto Liriano MD    Physician Orders: OT eval and tx  Medical Diagnosis: Carpal tunnel syndrome of right wrist [G56.01], Subacromial impingement of right shoulder [M75.41]   Evaluation Date: 2/5/2024  Insurance Authorization Period Expiration: 1/30/2026  Plan of Care Certification Period: 2/5/2024 to 3/15/2024  Progress Note Due: 3/15/2024     Visit # / Visits authorized: 5/20  FOTO: 2/3     Surgical Procedure: none new; hx of right rotator cuff repair                                   Date of Return to MD: PRN     Precautions:  Standard    Time In: 0700  Time Out: 0800  Total Billable Time: 60 minutes    Subjective     Pt reports: "My shoulder is still achy but it doesn't hurt. My hand is still tingling."    she was compliant with home exercise program given on evaluation.  Response to previous treatment: good  Functional change: improved postural awareness and correction    Pain: 0/10   Location: posterior right shoulder and right hand     Objective   Objective measures updated on this date.       Intake Outcome Measure for FOTO Shoulder Survey     Therapist reviewed FOTO scores for Pamela Lancaster on 2/29/2024.   FOTO documents entered into [employer] - see Media section.     Intake Score: 60%  - 60% on evaluation on 2/5/2024      Predicted Functional Score: 70%    Treatment     Pamela received the treatments listed below:      therapeutic exercises to develop strength, endurance, ROM, and flexibility of RUE for 40 minutes including:  - UBE (level 2.0) x 6 min for bilateral upper extremity strengthening and endurance  - resisted scap retractions/lat pull downs with black resistive band on rig 3x10  - scapula stability " "exercises: supine/reclined serratus punches and ABC's with 3# DB (shld flexed at 120*)  - sidelying ER (shld adducted, elbow bent at 90*) with 3# DB 3x10  - supine ER/IR (shld abducted, elbow bent at 90*) with 3# DB 3x10  - prone T's: 2x10  - resisted seated bilateral ER "W's" with red theraband 3x10  - median nerve flossing 2x10   - wrist flexor and extensor stretching (with extended elbow) x 30 sec holds, 2 rounds  - wrist AROM with 2# DB, forearm 3 positions over bolster, x 20 reps each    manual therapy techniques were applied to RUE for 5 minutes including:  - therapeutic cupping right carpal tunnel to decrease compression and increase circulation    neuromuscular re-education activities to improve Posture for 10 minutes including:  - seated bilateral scap retraction AROM 2x10  - chin tucks 2x10 with min A    therapeutic activities to improve functional performance of RUE for 5 minutes including:  - composite  strengthening with blue digiciser 3x10    direct contact modalities after being cleared for contraindications for 0 minutes including:  - NT    supervised modalities after being cleared for contradictions for 0 minutes including:  - NT    Home Exercises and Education Provided     Education provided:   - progress toward goals    Written Home Exercises Provided: Patient instructed to cont prior HEP  Exercises were reviewed and Pamela was able to demonstrate them prior to the end of the session. Pamela demonstrated good understanding of the HEP provided.     See EMR under Patient Instructions for exercises provided during prior visit.        Assessment     Pamela would continue to benefit from skilled OT. She was seen for her 5th tx session on this date. She continues to require verbal and tactile cueing for right scapula control but is tolerating PRE's better.    Pamela is progressing towards her goals and there are no updates to goals at this time. Pt prognosis is Good.     Pt will continue to benefit from " skilled outpatient occupational therapy to address the deficits listed in the problem list on initial evaluation provide pt/family education and to maximize pt's level of independence in the home and community environment.     Pt's spiritual, cultural and educational needs considered and pt agreeable to plan of care and goals.    Anticipated barriers to occupational therapy: none    Goals:  Short Term Goals: (4 weeks)  1. Pt will be independent with HEP in 2 visits. - MET 2/15/2024  2. Pt will report decreased right shoulder pain to a 3-4/10 with ADL/IADL tasks. - MET 2024   3. Pt will report  frequency or intensity of right hand paresthesia.  4. Pt will be independent with R wrist-supporting brace wear/care schedule.     Long Term Goals: (8 weeks)  1. Pt will report decreased right shoulder pain to 1-2/10 with ADL/IADL tasks.   2. Pt will report resolution of right hand paresthesia.   3. Pt will be independent with self-management of future exacerbations.   4. Pt will exhibit improved postural awareness and decreased scapula elevation during ADL/IADL tasks.  5. Pt will report an increase in FOTO intake score of > 65%, which would indicate an improvement in quality of life.    Plan     Plan of Care Certification: 2024 to 3/15/2024      Outpatient Occupational Therapy 2 times weekly for 6 weeks to include the following interventions PRN: Electrical Stimulation TENS, Fluidotherapy, Manual Therapy, Moist Heat/ Ice, Neuromuscular Re-ed, Orthotic Management and Training, Paraffin, Patient Education, Self Care, Therapeutic Activities, Therapeutic Exercise, and Ultrasound    Updates/Grading for next session: progress occupational therapy as tolerated    SALVATORE Granda

## 2024-03-04 ENCOUNTER — CLINICAL SUPPORT (OUTPATIENT)
Dept: REHABILITATION | Facility: HOSPITAL | Age: 69
End: 2024-03-04
Payer: MEDICARE

## 2024-03-04 DIAGNOSIS — R20.2 RIGHT HAND PARESTHESIA: ICD-10-CM

## 2024-03-04 DIAGNOSIS — M25.511 ACUTE PAIN OF RIGHT SHOULDER: Primary | ICD-10-CM

## 2024-03-04 PROCEDURE — 97110 THERAPEUTIC EXERCISES: CPT | Mod: PN

## 2024-03-04 NOTE — PROGRESS NOTES
"  [facility]  Occupational Therapy Treatment Note     Date: 3/4/2024  Name: Pamela Lancaster  Clinic Number: [id number]    Therapy Diagnosis:   Encounter Diagnoses   Name Primary?    Acute pain of right shoulder Yes    Right hand paresthesia      Physician: Sixto Liriano MD    Physician Orders: OT eval and tx  Medical Diagnosis: Carpal tunnel syndrome of right wrist [G56.01], Subacromial impingement of right shoulder [M75.41]   Evaluation Date: 2/5/2024  Insurance Authorization Period Expiration: 1/30/2026  Plan of Care Certification Period: 2/5/2024 to 3/15/2024  Progress Note Due: 3/15/2024     Visit # / Visits authorized: 6/20  FOTO: 2/3     Surgical Procedure: none new; hx of right rotator cuff repair                                   Date of Return to MD: PRN     Precautions:  Standard    Time In: 0900  Time Out: 1000  Total Billable Time: 30 minutes    Subjective     Pt reports: "I wish I could say my right shoulder is better but it really isn't. And now my left shoulder is bothering me."    she was compliant with home exercise program given on evaluation.  Response to previous treatment: fair   Functional change: none new on this date    Pain: 8/10   Location: bilateral shoulders    Objective   Objective measures updated at progress note unless specified.       Intake Outcome Measure for FOTO Shoulder Survey     Therapist reviewed FOTO scores for Pamela Lancaster on 2/29/2024.   FOTO documents entered into [employer] - see Media section.     Intake Score: 60%  - 60% on evaluation on 2/5/2024      Predicted Functional Score: 70%    Treatment     Pamela received the treatments listed below:      therapeutic exercises to develop strength, endurance, ROM, and flexibility of RUE for 45 minutes including:  - UBE (level 2.0) x 6 min for bilateral upper extremity strengthening and endurance  - lateral neck flexion stretches bilaterally x 30 sec holds each, 3 rounds  - bilateral shoulder IR and ER " "corner stretches, x 30 sec holds each, 2 rounds  - resisted scap retractions/lat pull downs with black resistive band on rig 3x10  - scap push-ups (prone of forearms) 3x10  - prone T's (with thumbs up and down): 2x10 each  - resisted seated bilateral ER "W's" with red theraband 3x10  - bilateral upper extremity median nerve flossing 2x10 each  - wrist flexor and extensor stretching (with extended elbow) x 30 sec holds, 2 rounds  - wrist AROM with 2# DB, forearm 3 positions over bolster, x 20 reps each    manual therapy techniques were applied to RUE for 0 minutes including:    neuromuscular re-education activities to improve Posture for 10 minutes including:  - seated bilateral scap retraction AROM 3x10  - chin tucks 3x10 with min A    therapeutic activities to improve functional performance of RUE for 5 minutes including:  - composite  strengthening with blue digiciser 5x10    direct contact modalities after being cleared for contraindications for 0 minutes including:  - NT    supervised modalities after being cleared for contradictions for 10 minutes including:  - MHPs to bilateral shoulders (during simultaneous wrist stretches and ROM exercises)    Home Exercises and Education Provided     Education provided:   - progress toward goals    Written Home Exercises Provided: Patient instructed to cont prior HEP  Exercises were reviewed and Pamela was able to demonstrate them prior to the end of the session. Pamela demonstrated good understanding of the HEP provided.     See EMR under Patient Instructions for exercises provided during prior visit.        Assessment     Pamela would continue to benefit from skilled OT. She reported not only increased right shoulder pain on this date, but also now left shoulder pain. She described pain as a soreness; therefore, tx continues to focus of bilateral shoulder stretches and postural awareness. Instructed pt on management of muscle soreness.    Pamela is progressing towards her " goals and there are no updates to goals at this time. Pt prognosis is Good.     Pt will continue to benefit from skilled outpatient occupational therapy to address the deficits listed in the problem list on initial evaluation provide pt/family education and to maximize pt's level of independence in the home and community environment.     Pt's spiritual, cultural and educational needs considered and pt agreeable to plan of care and goals.    Anticipated barriers to occupational therapy: none    Goals:  Short Term Goals: (4 weeks)  1. Pt will be independent with HEP in 2 visits. - MET 2/15/2024  2. Pt will report decreased right shoulder pain to a 3-4/10 with ADL/IADL tasks.  3. Pt will report  frequency or intensity of right hand paresthesia.  4. Pt will be independent with R wrist-supporting brace wear/care schedule.     Long Term Goals: (8 weeks)  1. Pt will report decreased right shoulder pain to 1-2/10 with ADL/IADL tasks.   2. Pt will report resolution of right hand paresthesia.   3. Pt will be independent with self-management of future exacerbations.   4. Pt will exhibit improved postural awareness and decreased scapula elevation during ADL/IADL tasks.  5. Pt will report an increase in FOTO intake score of > 65%, which would indicate an improvement in quality of life.    Plan     Plan of Care Certification: 2024 to 3/15/2024      Outpatient Occupational Therapy 2 times weekly for 6 weeks to include the following interventions PRN: Electrical Stimulation TENS, Fluidotherapy, Manual Therapy, Moist Heat/ Ice, Neuromuscular Re-ed, Orthotic Management and Training, Paraffin, Patient Education, Self Care, Therapeutic Activities, Therapeutic Exercise, and Ultrasound    Updates/Grading for next session: progress occupational therapy as tolerated    SALVATORE Granda

## 2024-03-06 ENCOUNTER — OFFICE VISIT (OUTPATIENT)
Dept: INTERNAL MEDICINE | Facility: CLINIC | Age: 69
End: 2024-03-06
Payer: MEDICARE

## 2024-03-06 ENCOUNTER — LAB VISIT (OUTPATIENT)
Dept: LAB | Facility: HOSPITAL | Age: 69
End: 2024-03-06
Attending: PEDIATRICS
Payer: MEDICARE

## 2024-03-06 VITALS
HEART RATE: 62 BPM | DIASTOLIC BLOOD PRESSURE: 78 MMHG | HEIGHT: 64 IN | BODY MASS INDEX: 31.69 KG/M2 | SYSTOLIC BLOOD PRESSURE: 122 MMHG | TEMPERATURE: 97 F | WEIGHT: 185.63 LBS | OXYGEN SATURATION: 99 % | RESPIRATION RATE: 16 BRPM

## 2024-03-06 DIAGNOSIS — R60.0 PEDAL EDEMA: ICD-10-CM

## 2024-03-06 DIAGNOSIS — Z12.11 COLON CANCER SCREENING: ICD-10-CM

## 2024-03-06 DIAGNOSIS — M15.9 PRIMARY OSTEOARTHRITIS INVOLVING MULTIPLE JOINTS: ICD-10-CM

## 2024-03-06 DIAGNOSIS — E78.00 HYPERCHOLESTEROLEMIA: ICD-10-CM

## 2024-03-06 DIAGNOSIS — Z91.013 SHELLFISH ALLERGY: Primary | ICD-10-CM

## 2024-03-06 DIAGNOSIS — Z11.59 ENCOUNTER FOR HEPATITIS C SCREENING TEST FOR LOW RISK PATIENT: ICD-10-CM

## 2024-03-06 DIAGNOSIS — R73.09 ELEVATED GLUCOSE: ICD-10-CM

## 2024-03-06 PROBLEM — M15.0 PRIMARY OSTEOARTHRITIS INVOLVING MULTIPLE JOINTS: Status: ACTIVE | Noted: 2024-03-06

## 2024-03-06 LAB
ALBUMIN SERPL BCP-MCNC: 3.5 G/DL (ref 3.5–5.2)
ALP SERPL-CCNC: 105 U/L (ref 55–135)
ALT SERPL W/O P-5'-P-CCNC: 20 U/L (ref 10–44)
ANION GAP SERPL CALC-SCNC: 8 MMOL/L (ref 8–16)
AST SERPL-CCNC: 32 U/L (ref 10–40)
BILIRUB SERPL-MCNC: 0.4 MG/DL (ref 0.1–1)
BUN SERPL-MCNC: 17 MG/DL (ref 8–23)
CALCIUM SERPL-MCNC: 9.8 MG/DL (ref 8.7–10.5)
CHLORIDE SERPL-SCNC: 106 MMOL/L (ref 95–110)
CHOLEST SERPL-MCNC: 152 MG/DL (ref 120–199)
CHOLEST/HDLC SERPL: 3.5 {RATIO} (ref 2–5)
CO2 SERPL-SCNC: 27 MMOL/L (ref 23–29)
CREAT SERPL-MCNC: 0.9 MG/DL (ref 0.5–1.4)
EST. GFR  (NO RACE VARIABLE): >60 ML/MIN/1.73 M^2
GLUCOSE SERPL-MCNC: 89 MG/DL (ref 70–110)
HDLC SERPL-MCNC: 44 MG/DL (ref 40–75)
HDLC SERPL: 28.9 % (ref 20–50)
LDLC SERPL CALC-MCNC: 87.8 MG/DL (ref 63–159)
NONHDLC SERPL-MCNC: 108 MG/DL
POTASSIUM SERPL-SCNC: 4.2 MMOL/L (ref 3.5–5.1)
PROT SERPL-MCNC: 6.7 G/DL (ref 6–8.4)
SODIUM SERPL-SCNC: 141 MMOL/L (ref 136–145)
TRIGL SERPL-MCNC: 101 MG/DL (ref 30–150)

## 2024-03-06 PROCEDURE — 36415 COLL VENOUS BLD VENIPUNCTURE: CPT | Performed by: PEDIATRICS

## 2024-03-06 PROCEDURE — 80061 LIPID PANEL: CPT | Performed by: PEDIATRICS

## 2024-03-06 PROCEDURE — 99205 OFFICE O/P NEW HI 60 MIN: CPT | Mod: S$PBB,,, | Performed by: PEDIATRICS

## 2024-03-06 PROCEDURE — 99214 OFFICE O/P EST MOD 30 MIN: CPT | Mod: PBBFAC | Performed by: PEDIATRICS

## 2024-03-06 PROCEDURE — 99999 PR PBB SHADOW E&M-EST. PATIENT-LVL IV: CPT | Mod: PBBFAC,,, | Performed by: PEDIATRICS

## 2024-03-06 PROCEDURE — 80053 COMPREHEN METABOLIC PANEL: CPT | Performed by: PEDIATRICS

## 2024-03-06 RX ORDER — NAPROXEN 500 MG/1
500 TABLET ORAL
COMMUNITY
Start: 2023-11-16 | End: 2024-05-01

## 2024-03-06 RX ORDER — LISINOPRIL 20 MG/1
10 TABLET ORAL
COMMUNITY
Start: 2023-11-16 | End: 2024-03-06

## 2024-03-06 RX ORDER — EPINEPHRINE 0.15 MG/.3ML
0.15 INJECTION INTRAMUSCULAR
Qty: 1 EACH | Refills: 11 | Status: SHIPPED | OUTPATIENT
Start: 2024-03-06

## 2024-03-06 RX ORDER — HYDROCHLOROTHIAZIDE 50 MG/1
25 TABLET ORAL
COMMUNITY
Start: 2023-11-16 | End: 2024-03-06 | Stop reason: SDUPTHER

## 2024-03-06 RX ORDER — EPINEPHRINE 0.15 MG/.3ML
0.15 INJECTION INTRAMUSCULAR
COMMUNITY
End: 2024-03-06 | Stop reason: SDUPTHER

## 2024-03-06 RX ORDER — ATORVASTATIN CALCIUM 40 MG/1
40 TABLET, FILM COATED ORAL DAILY
Qty: 90 TABLET | Refills: 3 | Status: SHIPPED | OUTPATIENT
Start: 2024-03-06 | End: 2024-10-20

## 2024-03-06 RX ORDER — HYDROCHLOROTHIAZIDE 50 MG/1
25 TABLET ORAL DAILY
Qty: 90 TABLET | Refills: 3 | Status: SHIPPED | OUTPATIENT
Start: 2024-03-06

## 2024-03-06 RX ORDER — CETIRIZINE HYDROCHLORIDE 10 MG/1
10 TABLET ORAL DAILY
Qty: 90 TABLET | Refills: 3 | Status: SHIPPED | OUTPATIENT
Start: 2024-03-06 | End: 2025-03-07

## 2024-03-06 NOTE — PROGRESS NOTES
Subjective     Patient ID: Pamela Lancaster is a 68 y.o. female.    Chief Complaint: Establish Care    Pamela Lancaster is a 68 year old female new patient here to establish care. [facility]    PMHx, PSHx, SocHx, and FHx reviewed and discussed with patient.    1) LIPIDS: on lipitor, trying to follow D&E  2) Fluid retention in lower extremities s/p knee replacement: on HCTZ  3) Shellfish Allergy: has epipen  4) GYN: [facility]  5) Osteoarthritis: neck/shoulder pain  6) Obesity: s/p bariatric surgery, had diagnosis of HTN with weight loss is off lisinopril    PSHx: Hysterectomy, gastric bypass, bilateral knee replacement, shoulder replacement, splenectomy, cataracts      Review of Systems   Constitutional:  Negative for chills and fever.   HENT:  Negative for nasal congestion and rhinorrhea.    Respiratory:  Negative for cough and shortness of breath.    Cardiovascular:  Negative for chest pain.   Gastrointestinal:  Negative for abdominal pain, blood in stool, change in bowel habit, constipation, diarrhea and nausea.   Endocrine: Negative for cold intolerance, heat intolerance, polydipsia, polyphagia and polyuria.   Genitourinary:  Negative for dysuria.   Musculoskeletal:  Positive for arthralgias.   Neurological:  Negative for weakness.          Objective     Physical Exam  Constitutional:       General: She is not in acute distress.     Appearance: Normal appearance. She is obese. She is not ill-appearing or toxic-appearing.   Cardiovascular:      Rate and Rhythm: Normal rate and regular rhythm.      Pulses: Normal pulses.      Heart sounds: Normal heart sounds. No murmur heard.     No gallop.   Pulmonary:      Effort: Pulmonary effort is normal. No respiratory distress.      Breath sounds: Normal breath sounds. No stridor. No wheezing, rhonchi or rales.   Chest:      Chest wall: No tenderness.   Abdominal:      General: There is no distension.      Palpations: There is no mass.       Tenderness: There is no abdominal tenderness.      Hernia: No hernia is present.      Comments: Midline surgical scar   Neurological:      General: No focal deficit present.      Mental Status: She is alert and oriented to person, place, and time. Mental status is at baseline.      Cranial Nerves: No cranial nerve deficit.      Motor: No weakness.      Gait: Gait normal.   Psychiatric:         Mood and Affect: Mood normal.         Behavior: Behavior normal.         Thought Content: Thought content normal.         Judgment: Judgment normal.            Assessment and Plan     1. Shellfish allergy    2. Hypercholesterolemia  -     Lipid Panel; Future; Expected date: 03/06/2024  -     Comprehensive Metabolic Panel; Future; Expected date: 03/06/2024  -     CBC Auto Differential; Future; Expected date: 03/06/2024  -     Lipid Panel; Future; Expected date: 03/06/2024  -     Comprehensive Metabolic Panel; Future; Expected date: 03/06/2024    3. Pedal edema    4. Primary osteoarthritis involving multiple joints    5. Colon cancer screening  -     Ambulatory referral/consult to Endo Procedure ; Future; Expected date: 03/07/2024    6. Elevated glucose  -     Hemoglobin A1C; Future; Expected date: 03/06/2024    7. Encounter for hepatitis C screening test for low risk patient  -     HEPATITIS C ANTIBODY; Future; Expected date: 03/06/2024    Other orders  -     atorvastatin (LIPITOR) 40 MG tablet; Take 1 tablet (40 mg total) by mouth once daily.  Dispense: 90 tablet; Refill: 3  -     cetirizine (ZYRTEC) 10 MG tablet; Take 1 tablet (10 mg total) by mouth once daily.  Dispense: 90 tablet; Refill: 3  -     EPINEPHrine (EPIPEN JR) 0.15 mg/0.3 mL pen injection; Inject 0.3 mLs (0.15 mg total) into the muscle as needed for Anaphylaxis.  Dispense: 1 each; Refill: 11  -     hydroCHLOROthiazide (HYDRODIURIL) 50 MG tablet; Take 0.5 tablets (25 mg total) by mouth once daily.  Dispense: 90 tablet; Refill: 3        Await labs. Due for  colonoscopy. HMI discussed. She [facility]. Maintain lipitor and HCTZ and zyrtec, prn epipen. F/u in 6 months.         Follow up in about 6 months (around 9/6/2024).

## 2024-03-07 ENCOUNTER — HOSPITAL ENCOUNTER (OUTPATIENT)
Dept: PREADMISSION TESTING | Facility: HOSPITAL | Age: 69
Discharge: HOME | End: 2024-03-07
Attending: PEDIATRICS
Payer: MEDICARE

## 2024-03-07 ENCOUNTER — CLINICAL SUPPORT (OUTPATIENT)
Dept: REHABILITATION | Facility: HOSPITAL | Age: 69
End: 2024-03-07
Payer: MEDICARE

## 2024-03-07 DIAGNOSIS — M25.511 ACUTE PAIN OF RIGHT SHOULDER: Primary | ICD-10-CM

## 2024-03-07 DIAGNOSIS — R20.2 RIGHT HAND PARESTHESIA: ICD-10-CM

## 2024-03-07 DIAGNOSIS — Z12.11 COLON CANCER SCREENING: Primary | ICD-10-CM

## 2024-03-07 PROCEDURE — 97112 NEUROMUSCULAR REEDUCATION: CPT | Mod: PN

## 2024-03-07 PROCEDURE — 97110 THERAPEUTIC EXERCISES: CPT | Mod: PN

## 2024-03-07 RX ORDER — SODIUM, POTASSIUM,MAG SULFATES 17.5-3.13G
1 SOLUTION, RECONSTITUTED, ORAL ORAL DAILY
Qty: 1 KIT | Refills: 0 | Status: SHIPPED | OUTPATIENT
Start: 2024-03-07 | End: 2024-03-09

## 2024-03-07 NOTE — PROGRESS NOTES
"  [facility]  Occupational Therapy Treatment Note     Date: 3/7/2024  Name: Pamela Lancaster  Clinic Number: [id number]    Therapy Diagnosis:   Encounter Diagnoses   Name Primary?    Acute pain of right shoulder Yes    Right hand paresthesia      Physician: Sixto Liriano MD    Physician Orders: OT eval and tx  Medical Diagnosis: Carpal tunnel syndrome of right wrist [G56.01], Subacromial impingement of right shoulder [M75.41]   Evaluation Date: 2/5/2024  Insurance Authorization Period Expiration: 1/30/2026  Plan of Care Certification Period: 2/5/2024 to 3/15/2024  Progress Note Due: 3/15/2024     Visit # / Visits authorized: 7/20  FOTO: 2/3     Surgical Procedure: none new; hx of right rotator cuff repair                                   Date of Return to MD: PRN     Precautions:  Standard    Time In: 0700  Time Out: 0800  Total Billable Time: 60 minutes    Subjective     Pt reports: "I saw my doctor yesterday. He said it's just my arthritis."    she was compliant with home exercise program given on evaluation.  Response to previous treatment: good  Functional change: decreased pain    Pain: 6/10   Location: bilateral shoulders    Objective   Objective measures updated at progress note unless specified.       Intake Outcome Measure for FOTO Shoulder Survey     Therapist reviewed FOTO scores for Pamela Lancaster on 2/29/2024.   FOTO documents entered into [employer] - see Media section.     Intake Score: 60%  - 60% on evaluation on 2/5/2024      Predicted Functional Score: 70%    Treatment     Pamela received the treatments listed below:      therapeutic exercises to develop strength, endurance, ROM, and flexibility of RUE for 36 minutes including:  - UBE (level 2.0) x 6 min for bilateral upper extremity strengthening and endurance  - bilateral shoulder IR and ER doorway stretches, x 30 sec holds each, 2 rounds  - bilateral shoulder abduction wall slides x 10 reps each  - scap push-ups (prone of " "forearms) 3x10  - prone T's (with thumbs up and down): 2x10 each  - resisted seated bilateral ER "W's" with red theraband 3x10  - bilateral upper extremity median nerve flossing 2x10 each  - seated bilateral shoulder ER (shld abducted, elbow bent at 90*) AROM 3x10  - thumb AROM: radial and palmar abd/add, circumduction (both directions), opposition to DPC, IP flexion, and lifts x 10 reps each  - wrist flexor and extensor stretching (with extended elbow) x 30 sec holds, 2 rounds  - wrist AROM with 2# DB, forearm 3 positions over bolster, x 20 reps each    manual therapy techniques were applied to RUE for 4 minutes including:  - therapeutic cupping right carpal tunnel to decrease compression and increase circulation    neuromuscular re-education activities to improve Posture for 10 minutes including:  - seated bilateral scap retraction AROM 3x10  - chin tucks 3x10 with min A    therapeutic activities to improve functional performance of RUE for 4 minutes including:  - composite  strengthening with blue digiciser 5x10    direct contact modalities after being cleared for contraindications for 0 minutes including:  - NT    supervised modalities after being cleared for contradictions for 10 minutes including:  - MHPs to bilateral shoulders (during simultaneous wrist stretches and ROM exercises)    Home Exercises and Education Provided     Education provided:   - progress toward goals  - reissued/reviewed precautions for arthritis and carpal tunnel  - use of heat for management of painful and/or stiff joints    Written Home Exercises Provided: Patient instructed to cont prior HEP  Exercises were reviewed and Pamela was able to demonstrate them prior to the end of the session. Pamela demonstrated good understanding of the HEP provided.     See EMR under Patient Instructions for exercises provided during prior visit.        Assessment     Pamela would continue to benefit from skilled OT. She reported a slight decrease in " bilateral shoulder pain upon arrival and tolerated exercises better. Her PCP believes she is just having an arthritic exacerbation.     Pamela is progressing towards her goals and there are no updates to goals at this time. Pt prognosis is Good.     Pt will continue to benefit from skilled outpatient occupational therapy to address the deficits listed in the problem list on initial evaluation provide pt/family education and to maximize pt's level of independence in the home and community environment.     Pt's spiritual, cultural and educational needs considered and pt agreeable to plan of care and goals.    Anticipated barriers to occupational therapy: none    Goals:  Short Term Goals: (4 weeks)  1. Pt will be independent with HEP in 2 visits. - MET 2/15/2024  2. Pt will report decreased right shoulder pain to a 3-4/10 with ADL/IADL tasks.  3. Pt will report  frequency or intensity of right hand paresthesia.  4. Pt will be independent with R wrist-supporting brace wear/care schedule.     Long Term Goals: (8 weeks)  1. Pt will report decreased right shoulder pain to 1-2/10 with ADL/IADL tasks.   2. Pt will report resolution of right hand paresthesia.   3. Pt will be independent with self-management of future exacerbations.   4. Pt will exhibit improved postural awareness and decreased scapula elevation during ADL/IADL tasks.  5. Pt will report an increase in FOTO intake score of > 65%, which would indicate an improvement in quality of life.    Plan     Plan of Care Certification: 2024 to 3/15/2024      Outpatient Occupational Therapy 2 times weekly for 6 weeks to include the following interventions PRN: Electrical Stimulation TENS, Fluidotherapy, Manual Therapy, Moist Heat/ Ice, Neuromuscular Re-ed, Orthotic Management and Training, Paraffin, Patient Education, Self Care, Therapeutic Activities, Therapeutic Exercise, and Ultrasound    Updates/Grading for next session: progress occupational therapy as  tolerated    SALVATORE Granda

## 2024-03-11 ENCOUNTER — CLINICAL SUPPORT (OUTPATIENT)
Dept: REHABILITATION | Facility: HOSPITAL | Age: 69
End: 2024-03-11
Payer: MEDICARE

## 2024-03-11 DIAGNOSIS — R20.2 RIGHT HAND PARESTHESIA: ICD-10-CM

## 2024-03-11 DIAGNOSIS — M25.511 ACUTE PAIN OF RIGHT SHOULDER: Primary | ICD-10-CM

## 2024-03-11 PROCEDURE — 97112 NEUROMUSCULAR REEDUCATION: CPT | Mod: PN

## 2024-03-11 PROCEDURE — 97110 THERAPEUTIC EXERCISES: CPT | Mod: PN

## 2024-03-11 NOTE — PLAN OF CARE
"  [facility]  Occupational Therapy Updated Plan of Care     Date: 3/11/2024  Name: Pamela Lancaster  Clinic Number: [id number]    Therapy Diagnosis:   Encounter Diagnoses   Name Primary?    Acute pain of right shoulder Yes    Right hand paresthesia      Physician: Sixto Liriano MD    Physician Orders: OT eval and tx  Medical Diagnosis: Carpal tunnel syndrome of right wrist [G56.01], Subacromial impingement of right shoulder [M75.41]   Evaluation Date: 2/5/2024  Insurance Authorization Period Expiration: 1/30/2026  Previous Plan of Care Certification Period: 2/5/2024 to 3/15/2024   New Cert Period: 3/11/2024 - 4/12/2024     Visit # / Visits authorized: 8/20  FOTO: 2/3     Surgical Procedure: none new; hx of right rotator cuff repair                                   Date of Return to MD: PRN     Precautions:  Standard    Subjective     Pt reports: "I had to sleep on the hospital couch all weekend. My shoulder hurts when I sneeze. I'm not wearing a wrist brace. I don't think I have carpal tunnel. I don't have any wrist/hand pain and the tingling is coming from my neck and shoulder."    Pain: 6/10   Location: bilateral shoulders    Objective     Manual Muscles Test:    Right Right Left   Strength 3/11/2024 2/5/2024 2/5/2024   Shoulder flexion 4+/5 4+/5 5/5   Shoulder abduction 4+/5 4+/5 5/5   Shoulder internal rotation  (Shoulder adducted at side) 5/5 4+/5 5/5   Shoulder external rotation  (Shoulder adducted at side) 4+/5 4+/5 5/5   Elbow flexion 5/5 4+/5 5/5   Elbow extension 5/5 4+/5 5/5   Pt continues to elevate right scapula with resisted shoulder strength testing                                         Intake Outcome Measure for FOTO Shoulder Survey     Therapist reviewed FOTO scores for Pamela Lancaster on 2/29/2024.   FOTO documents entered into [employer] - see Media section.     Intake Score: 60%  - 60% on evaluation on 2/5/2024      Predicted Functional Score: 70%    Assessment     Pamela " has been seen for 8 tx sessions since her initial evaluation on 2024. Overall, she is exhibiting improved postural awareness and can correct scapula elevation during ADL/IADL tasks when cued. However, she continues to be limited by shoulder pain and learned compensatory strategies over the past 15 years since her right rotator cuff repair. She is also limited by arthritic pain. She does not report any right wrist/hand pain and reports decreased overall paresthesia is her right hand. She does not believe that she has carpal tunnel syndrome, believes her symptoms are coming from her neck and shoulder, and does not wish to wear a right wrist brace. Therefore, any further goals pertaining to management of carpal tunnel syndrome have been discontinued. Tx to continue to focus on decreasing right shoulder pain and impingement, and improving postural awareness and scapula control.    Pamela is progressing towards her goals. See below for goal updates and achievements.  Pt prognosis is Good.     Pamela will continue to benefit from skilled outpatient occupational therapy to address the deficits listed in the problem list on initial evaluation provide pt/family education and to maximize pt's level of independence in the home and community environment.     Her spiritual, cultural and educational needs considered and pt agreeable to plan of care and goals.    Anticipated barriers to occupational therapy: none    Goals:  Short Term Goals: (4 weeks)  1. Pt will be independent with HEP in 2 visits. - MET 2/15/2024  2. Pt will report decreased right shoulder pain to a 3-4/10 with ADL/IADL tasks. - progressing, continue goal 3/11/2024  3. Pt will report  frequency or intensity of right hand paresthesia. - MET 3/11/2024  4. Pt will be independent with R wrist-supporting brace wear/care schedule. - discontinue goal 3/11/2024     Long Term Goals: (8 weeks)  1. Pt will report decreased right shoulder pain to 1-2/10 with  ADL/IADL tasks. - progressing, continue goal 3/11/2024  2. Pt will report resolution of right hand paresthesia. - discontinue goal 3/11/2024  3. Pt will be independent with self-management of future exacerbations. - discontinue goal 3/11/2024  4. Pt will exhibit improved postural awareness and decreased scapula elevation during ADL/IADL tasks. - MET 3/11/2024  5. Pt will report an increase in FOTO intake score of > 65%, which would indicate an improvement in quality of life. - progressing, continue goal 3/11/2024    New Long Term Goals: (4 weeks - set on 3/11/2024)  Pt will be independent with management of bilateral shoulder arthritic pain.  Pt will exhibit decreased compensatory movement patterns of right shoulder during ADL/IADL tasks.    Plan     Previous Plan of Care Certification: 2/5/2024 to 3/15/2024  New Plan of Care Certification Period: 3/11/2024 - 4/12/2024      Continue Outpatient Occupational Therapy 2 times weekly for 4 weeks to include the following interventions PRN: Electrical Stimulation TENS, Fluidotherapy, Manual Therapy, Moist Heat/ Ice, Neuromuscular Re-ed, Orthotic Management and Training, Paraffin, Patient Education, Self Care, Therapeutic Activities, Therapeutic Exercise, and Ultrasound    Myrna Feliciano, DIGNA, NAT, SALVATORE

## 2024-03-11 NOTE — PROGRESS NOTES
"  [facility]  Occupational Therapy Treatment Note & Progress Note     Date: 3/11/2024  Name: Pamela Lancaster  Clinic Number: [id number]    Therapy Diagnosis:   Encounter Diagnoses   Name Primary?    Acute pain of right shoulder Yes    Right hand paresthesia      Physician: Sixto Liriano MD    Physician Orders: OT eval and tx  Medical Diagnosis: Carpal tunnel syndrome of right wrist [G56.01], Subacromial impingement of right shoulder [M75.41]   Evaluation Date: 2/5/2024  Insurance Authorization Period Expiration: 1/30/2026  Previous Plan of Care Certification Period: 2/5/2024 to 3/15/2024 (POC updated on this date. New Cert Period: 3/11/2024 - 4/12/2024)     Visit # / Visits authorized: 8/20  FOTO: 2/3     Surgical Procedure: none new; hx of right rotator cuff repair                                   Date of Return to MD: PRN     Precautions:  Standard    Time In: 0750  Time Out: 0850  Total Billable Time: 60 minutes    Subjective     Pt reports: "I had to sleep on the hospital couch all weekend. My shoulder hurts when I sneeze. I'm not wearing a wrist brace. I don't think I have carpal tunnel. I don't have any wrist/hand pain and the tingling is coming from my neck and shoulder."    she was compliant with home exercise program given on evaluation.  Response to previous treatment: good  Functional change: improved postural awareness    Pain: 6/10   Location: bilateral shoulders    Objective   Objective measures updated on this date.    Manual Muscles Test:    Right Right Left   Strength 3/11/2024 2/5/2024 2/5/2024   Shoulder flexion 4+/5 4+/5 5/5   Shoulder abduction 4+/5 4+/5 5/5   Shoulder internal rotation  (Shoulder adducted at side) 5/5 4+/5 5/5   Shoulder external rotation  (Shoulder adducted at side) 4+/5 4+/5 5/5   Elbow flexion 5/5 4+/5 5/5   Elbow extension 5/5 4+/5 5/5   Pt continues to elevate right scapula with resisted shoulder strength testing                             " "            Intake Outcome Measure for FOTO Shoulder Survey     Therapist reviewed FOTO scores for Pamela Lancaster on 2/29/2024.   FOTO documents entered into "[employer] - see Media section.     Intake Score: 60%  - 60% on evaluation on 2/5/2024      Predicted Functional Score: 70%    Treatment     Pamela received the treatments listed below:      therapeutic exercises to develop strength, endurance, ROM, and flexibility of RUE for 40 minutes including:  - UBE (level 2.0) x 6 min for bilateral upper extremity strengthening and endurance  - lateral neck flexion stretches bilaterally x 30 sec holds each  - bilateral shoulder IR and ER doorway stretches, x 30 sec holds each, 2 rounds  - bilateral shoulder IR and ER "swimmers" (overhead and behind low back) AROM 2x10  - scap push-ups (prone of forearms) 3x10  - prone T's 2x10 each  - bilateral upper extremity median nerve flossing 2x10 each  - scapula stability exercises: supine/reclined serratus punches and ABC's with 3# DB (shld flexed at 120*)  - supine/reclined ER (shld adducted, elbow bent at 90*) with 3# DB 3x10    manual therapy techniques were applied to RUE for 10 minutes including:  - GHJ grade 2 inferior mobilizations to decrease pain and increase ROM  - STM to levator and rhomboids to decrease pain and muscle tension and to increase circulation    neuromuscular re-education activities to improve Posture for 10 minutes including:  - seated bilateral scap retraction AROM 3x10  - chin tucks 3x10     therapeutic activities to improve functional performance of RUE for 0 minutes including:  - NT    direct contact modalities after being cleared for contraindications for 0 minutes including:  - NT    supervised modalities after being cleared for contradictions for 10 minutes including:  - MHP to right shoulder (during simultaneous postural exercises)    Home Exercises and Education Provided     Education provided:   - progress toward goals    Written Home Exercises Provided: " Patient instructed to cont prior HEP  Exercises were reviewed and Pamela was able to demonstrate them prior to the end of the session. Pamela demonstrated good understanding of the HEP provided.     See EMR under Patient Instructions for exercises provided during prior visit.        Assessment     Pamela has been seen for 8 tx sessions since her initial evaluation on 2/5/2024. Overall, she is exhibiting improved postural awareness and can correct scapula elevation during ADL/IADL tasks when cued. However, she continues to be limited by shoulder pain and learned compensatory strategies over the past 15 years since her right rotator cuff repair. She is also limited by arthritic pain. She does not report any right wrist/hand pain and reports decreased overall paresthesia is her right hand. She does not believe that she has carpal tunnel syndrome, believes her symptoms are coming from her neck and shoulder, and does not wish to wear a right wrist brace. Therefore, any further goals pertaining to management of carpal tunnel syndrome have been discontinued. Tx to continue to focus on decreasing right shoulder pain and impingement, and improving postural awareness and scapula control.    Pamela is progressing towards her goals. See below for goal updates and achievements.  Pt prognosis is Good.     Pamela will continue to benefit from skilled outpatient occupational therapy to address the deficits listed in the problem list on initial evaluation provide pt/family education and to maximize pt's level of independence in the home and community environment.     Her spiritual, cultural and educational needs considered and pt agreeable to plan of care and goals.    Anticipated barriers to occupational therapy: none    Goals:  Short Term Goals: (4 weeks)  1. Pt will be independent with HEP in 2 visits. - MET 2/15/2024  2. Pt will report decreased right shoulder pain to a 3-4/10 with ADL/IADL tasks. - progressing, continue goal  3/11/2024  3. Pt will report  frequency or intensity of right hand paresthesia. - MET 3/11/2024  4. Pt will be independent with R wrist-supporting brace wear/care schedule. - discontinue goal 3/11/2024     Long Term Goals: (8 weeks)  1. Pt will report decreased right shoulder pain to 1-2/10 with ADL/IADL tasks. - progressing, continue goal 3/11/2024  2. Pt will report resolution of right hand paresthesia. - discontinue goal 3/11/2024  3. Pt will be independent with self-management of future exacerbations. - discontinue goal 3/11/2024  4. Pt will exhibit improved postural awareness and decreased scapula elevation during ADL/IADL tasks. - MET 3/11/2024  5. Pt will report an increase in FOTO intake score of > 65%, which would indicate an improvement in quality of life. - progressing, continue goal 3/11/2024    New Long Term Goals: (4 weeks - set on 3/11/2024)  Pt will be independent with management of bilateral shoulder arthritic pain.  Pt will exhibit decreased compensatory movement patterns of right shoulder during ADL/IADL tasks.    Plan     Previous Plan of Care Certification: 2024 to 3/15/2024  New Plan of Care Certification Period: 3/11/2024 - 2024      Continue Outpatient Occupational Therapy 2 times weekly for 4 weeks to include the following interventions PRN: Electrical Stimulation TENS, Fluidotherapy, Manual Therapy, Moist Heat/ Ice, Neuromuscular Re-ed, Orthotic Management and Training, Paraffin, Patient Education, Self Care, Therapeutic Activities, Therapeutic Exercise, and Ultrasound    Updates/Grading for next session: progress occupational therapy as tolerated    Myrna Feliciano, DIGNA, CHNILES, SALVATORE

## 2024-03-14 ENCOUNTER — CLINICAL SUPPORT (OUTPATIENT)
Dept: REHABILITATION | Facility: HOSPITAL | Age: 69
End: 2024-03-14
Payer: MEDICARE

## 2024-03-14 DIAGNOSIS — R20.2 RIGHT HAND PARESTHESIA: ICD-10-CM

## 2024-03-14 DIAGNOSIS — M25.511 ACUTE PAIN OF RIGHT SHOULDER: Primary | ICD-10-CM

## 2024-03-14 PROCEDURE — 97112 NEUROMUSCULAR REEDUCATION: CPT | Mod: PN

## 2024-03-14 PROCEDURE — 97110 THERAPEUTIC EXERCISES: CPT | Mod: PN

## 2024-03-14 NOTE — PROGRESS NOTES
"  [facility]  Occupational Therapy Treatment Note      Date: 3/14/2024  Name: Pamela Lancaster  Clinic Number: [id number]    Therapy Diagnosis:   Encounter Diagnoses   Name Primary?    Acute pain of right shoulder Yes    Right hand paresthesia      Physician: Sixto Liriano MD    Physician Orders: OT eval and tx  Medical Diagnosis: Carpal tunnel syndrome of right wrist [G56.01], Subacromial impingement of right shoulder [M75.41]   Evaluation Date: 2/5/2024  Insurance Authorization Period Expiration: 1/30/2026  Previous Plan of Care Certification Period: 3/11/2024 - 4/12/2024     Visit # / Visits authorized: 9/20  FOTO: 3/3     Surgical Procedure: none new; hx of right rotator cuff repair                                   Date of Return to MD: PRN     Precautions:  Standard    Time In: 0700  Time Out: 0800  Total Billable Time: 60 minutes    Subjective     Pt reports: "I don't have any pain in my shoulders anymore. I worked in the yard doing some landscaping and it didn't bother me. I even slept on my right arm. I'm not having any tingling in my hand anymore either."    she was compliant with home exercise program given on evaluation.  Response to previous treatment: excellent  Functional change: resolution of bilateral shoulder pain    Pain: 0/10   Location: bilateral shoulders    Objective   Objective measures updated on this date.                                Intake Outcome Measure for FOTO Shoulder Survey     Therapist reviewed FOTO scores for Pamela Lancaster on 3/14/2024.   FOTO documents entered into [employer] - see Media section.     Intake Score: 78% on 3/14/2024  - 60% on 2/29/2024  - 60% on evaluation on 2/5/2024      Predicted Functional Score: 70%    Treatment     Pamela received the treatments listed below:      therapeutic exercises to develop strength, endurance, ROM, and flexibility of RUE for 40 minutes including:  - UBE (level 2.0) x 6 min for bilateral upper extremity " "strengthening and endurance  - bilateral shoulder IR and ER doorway stretches, x 30 sec holds each, 2 rounds  - bilateral shoulder IR and ER "swimmers" (overhead and behind low back) AROM 2x10  - resisted scap retractions/lat pull downs with purple resistive band on rig 3x10  - scap push-ups (prone of forearms) 3x10  - prone T's 2x10 each  - resisted seated bilateral ER "W's" with green theraband 3x10  - bilateral upper extremity median nerve flossing 2x10 each  - scapula stability exercises: supine/reclined serratus punches and ABC's with 3# DB (shld flexed at 120*)  - seated bilateral shoulder ER (shld abducted, elbow bent at 90*) with 2# DB's 3x10    manual therapy techniques were applied to RUE for 0 minutes including:    neuromuscular re-education activities to improve Posture for 12 minutes including:  - seated bilateral scap retraction AROM 3x10  - chin tucks 3x10   - carrying bilateral 10 KB's in each hand while ambulating around gym - 2 laps    therapeutic activities to improve functional performance of RUE for 5 minutes including:  - composite  strengthening with Progressive Hand Exerciser (3rd position) 5x10    direct contact modalities after being cleared for contraindications for 0 minutes including:  - NT    supervised modalities after being cleared for contradictions for 10 minutes including:  - MHP to right shoulder (during simultaneous postural exercises)    Home Exercises and Education Provided     Education provided:   - progress toward goals    Written Home Exercises Provided: Patient instructed to cont prior HEP  Exercises were reviewed and Pamela was able to demonstrate them prior to the end of the session. Pamela demonstrated good understanding of the HEP provided.     See EMR under Patient Instructions for exercises provided during prior visit.        Assessment     Pamela returned to therapy today reporting resolution of her bilateral shoulder pain and her right arm paresthesia. She " tolerated all exercises and PRE's well and reported an increase in functional use with her FOTO assessment. Discharge planning initiated - will plan to discharge next session if resolution of symptoms continue.    Pamela is progressing towards her goals. There are no updates to goals at this time.  Pt prognosis is Good.     Pamela will continue to benefit from skilled outpatient occupational therapy to address the deficits listed in the problem list on initial evaluation provide pt/family education and to maximize pt's level of independence in the home and community environment.     Her spiritual, cultural and educational needs considered and pt agreeable to plan of care and goals.    Anticipated barriers to occupational therapy: none    Goals:  Short Term Goals: (4 weeks)  1. Pt will be independent with HEP in 2 visits. - MET 2/15/2024  2. Pt will report decreased right shoulder pain to a 3-4/10 with ADL/IADL tasks. - MET 3/14/2024  3. Pt will report  frequency or intensity of right hand paresthesia. - MET 3/11/2024  4. Pt will be independent with R wrist-supporting brace wear/care schedule. - discontinue goal 3/11/2024     Long Term Goals: (8 weeks)  1. Pt will report decreased right shoulder pain to 1-2/10 with ADL/IADL tasks. - MET 3/14/2024  2. Pt will report resolution of right hand paresthesia. - MET 3/14/2024  3. Pt will be independent with self-management of future exacerbations. - discontinue goal 3/11/2024  4. Pt will exhibit improved postural awareness and decreased scapula elevation during ADL/IADL tasks. - MET 3/11/2024  5. Pt will report an increase in FOTO intake score of > 65%, which would indicate an improvement in quality of life. - MET 3/14/2024    New Long Term Goals: (4 weeks - set on 3/11/2024)  Pt will be independent with management of bilateral shoulder arthritic pain.  Pt will exhibit decreased compensatory movement patterns of right shoulder during ADL/IADL tasks.    Plan      Previous Plan of Care Certification: 3/11/2024 - 4/12/2024      Continue Outpatient Occupational Therapy 2 times weekly for 4 weeks to include the following interventions PRN: Electrical Stimulation TENS, Fluidotherapy, Manual Therapy, Moist Heat/ Ice, Neuromuscular Re-ed, Orthotic Management and Training, Paraffin, Patient Education, Self Care, Therapeutic Activities, Therapeutic Exercise, and Ultrasound    Updates/Grading for next session: progress occupational therapy as tolerated    DIGNA Graham, CHT, SALVATORE

## 2024-03-18 ENCOUNTER — CLINICAL SUPPORT (OUTPATIENT)
Dept: REHABILITATION | Facility: HOSPITAL | Age: 69
End: 2024-03-18
Payer: MEDICARE

## 2024-03-18 DIAGNOSIS — R20.2 RIGHT HAND PARESTHESIA: ICD-10-CM

## 2024-03-18 DIAGNOSIS — M25.511 ACUTE PAIN OF RIGHT SHOULDER: Primary | ICD-10-CM

## 2024-03-18 PROCEDURE — 97110 THERAPEUTIC EXERCISES: CPT | Mod: PN

## 2024-03-18 PROCEDURE — 97112 NEUROMUSCULAR REEDUCATION: CPT | Mod: PN

## 2024-03-18 NOTE — PROGRESS NOTES
"  [facility]  Occupational Therapy Treatment Note & Discharge Summary     Date: 3/18/2024  Name: Pamela Lancaster  Clinic Number: [id number]    Therapy Diagnosis:   Encounter Diagnoses   Name Primary?    Acute pain of right shoulder Yes    Right hand paresthesia      Physician: Sixto Liriano MD    Physician Orders: OT eval and tx  Medical Diagnosis: Carpal tunnel syndrome of right wrist [G56.01], Subacromial impingement of right shoulder [M75.41]   Evaluation Date: 2/5/2024  Insurance Authorization Period Expiration: 1/30/2026  Previous Plan of Care Certification Period: 3/11/2024 - 4/12/2024     Visit # / Visits authorized: 10/20  FOTO: 3/3     Surgical Procedure: none new; hx of right rotator cuff repair                                   Date of Return to MD: PRN     Precautions:  Standard    Time In: 0750  Time Out: 0850  Total Billable Time: 60 minutes    Subjective     Pt reports: "I'm not having any shoulder pain or any tingling in my arm."    she was compliant with home exercise program given on evaluation.  Response to previous treatment: excellent  Functional change: resolution of pain and paresthesia    Pain: 0/10   Location: bilateral shoulders    Objective   Objective measures updated on this date.                                Intake Outcome Measure for FOTO Shoulder Survey     Therapist reviewed FOTO scores for Pamela Lancaster on 3/14/2024.   FOTO documents entered into [employer] - see Media section.     Intake Score: 78% on 3/14/2024  - 60% on 2/29/2024  - 60% on evaluation on 2/5/2024      Predicted Functional Score: 70%    Treatment     Pamela received the treatments listed below:      therapeutic exercises to develop strength, endurance, ROM, and flexibility of RUE for 40 minutes including:  - UBE (level 2.0) x 6 min for bilateral upper extremity strengthening and endurance  - bilateral shoulder IR and ER doorway stretches, x 30 sec holds each, 2 rounds  - resisted scap " "retractions with level 4 theratube 3x10  - resisted lat pull downs with purple resistive band on rig 3x10  - resisted bilateral ER "W's" with level 1 theratube 3x10  - bilateral shoulder ER (shld abducted, elbow bent at 90*) with 2# DB's 3x10  - bilateral shoulder overhead press with 2# DB's 3x10  - bilateral upper extremity median nerve flossing 2x10 each  - scap push-ups (elbows extended) against inclined surface 3x10  - elbow flexion/bicep curls with 2# DB's 3x10    neuromuscular re-education activities to improve Posture for 12 minutes including:  - bilateral scap retraction AROM 3x10  - chin tucks 3x10   - carrying bilateral 10 KB's in each hand while ambulating around gym - 3 laps    therapeutic activities to improve functional performance of RUE for 5 minutes including:  - composite  strengthening with Progressive Hand Exerciser (3rd position) 5x10    Home Exercises and Education Provided     Education provided:   - progress toward goals  - issued precautions for arthritis and joint protection principles  - addressed any d/c concerns and questions    Written Home Exercises Provided: Patient instructed to cont prior HEP  Exercises were reviewed and Pamlea was able to demonstrate them prior to the end of the session. Pamela demonstrated good understanding of the HEP provided.     See EMR under Patient Instructions for exercises provided during prior visit.        Assessment     Pamela made excellent progress with therapy. She has been very compliant with her HEP and reports resolution of bilateral shoulder pain and right upper extremity paresthesia. She also exhibits improved postural awareness and decreased compensatory movement patterns of her right shoulder and scapula during ADL/IADL tasks. She has met all goals and is safe to discharge at this time.    Goals:  Short Term Goals: (4 weeks)  1. Pt will be independent with HEP in 2 visits. - MET 2/15/2024  2. Pt will report decreased right shoulder pain to a " 3-4/10 with ADL/IADL tasks. - MET 3/14/2024  3. Pt will report  frequency or intensity of right hand paresthesia. - MET 3/11/2024  4. Pt will be independent with R wrist-supporting brace wear/care schedule. - discontinue goal 3/11/2024     Long Term Goals: (8 weeks)  1. Pt will report decreased right shoulder pain to 1-2/10 with ADL/IADL tasks. - MET 3/14/2024  2. Pt will report resolution of right hand paresthesia. - MET 3/14/2024  3. Pt will be independent with self-management of future exacerbations. - MET 3/18/2024  4. Pt will exhibit improved postural awareness and decreased scapula elevation during ADL/IADL tasks. - MET 3/11/2024  5. Pt will report an increase in FOTO intake score of > 65%, which would indicate an improvement in quality of life. - MET 3/14/2024    New Long Term Goals: (4 weeks - set on 3/11/2024)  Pt will be independent with management of bilateral shoulder arthritic pain. - MET 3/18/2024  Pt will exhibit decreased compensatory movement patterns of right shoulder during ADL/IADL tasks. - MET 3/18/2024    Plan     Discharge occupational therapy services    Myrna Feliciano, DIGNA, NAT, SALVATORE

## 2024-03-27 ENCOUNTER — ANESTHESIA (OUTPATIENT)
Dept: ENDOSCOPY | Facility: HOSPITAL | Age: 69
End: 2024-03-27
Payer: MEDICARE

## 2024-03-27 ENCOUNTER — HOSPITAL ENCOUNTER (OUTPATIENT)
Facility: HOSPITAL | Age: 69
Discharge: HOME | End: 2024-03-27
Attending: COLON & RECTAL SURGERY | Admitting: COLON & RECTAL SURGERY
Payer: MEDICARE

## 2024-03-27 ENCOUNTER — ANESTHESIA EVENT (OUTPATIENT)
Dept: ENDOSCOPY | Facility: HOSPITAL | Age: 69
End: 2024-03-27
Payer: MEDICARE

## 2024-03-27 DIAGNOSIS — Z12.11 SCREENING FOR COLON CANCER: ICD-10-CM

## 2024-03-27 PROCEDURE — 88305 TISSUE EXAM BY PATHOLOGIST: CPT | Mod: 26,,, | Performed by: STUDENT IN AN ORGANIZED HEALTH CARE EDUCATION/TRAINING PROGRAM

## 2024-03-27 PROCEDURE — 45385 COLONOSCOPY W/LESION REMOVAL: CPT | Mod: PT | Performed by: COLON & RECTAL SURGERY

## 2024-03-27 PROCEDURE — 27201012 HC FORCEPS, HOT/COLD, DISP: Performed by: COLON & RECTAL SURGERY

## 2024-03-27 PROCEDURE — 63600175 PHARM REV CODE 636 W HCPCS: Performed by: NURSE ANESTHETIST, CERTIFIED REGISTERED

## 2024-03-27 PROCEDURE — 25000003 PHARM REV CODE 250: Performed by: NURSE ANESTHETIST, CERTIFIED REGISTERED

## 2024-03-27 PROCEDURE — 88305 TISSUE EXAM BY PATHOLOGIST: CPT | Performed by: STUDENT IN AN ORGANIZED HEALTH CARE EDUCATION/TRAINING PROGRAM

## 2024-03-27 PROCEDURE — 27201089 HC SNARE, DISP (ANY): Performed by: COLON & RECTAL SURGERY

## 2024-03-27 PROCEDURE — 37000009 HC ANESTHESIA EA ADD 15 MINS: Performed by: COLON & RECTAL SURGERY

## 2024-03-27 PROCEDURE — 45380 COLONOSCOPY AND BIOPSY: CPT | Mod: PT,59,, | Performed by: COLON & RECTAL SURGERY

## 2024-03-27 PROCEDURE — 45380 COLONOSCOPY AND BIOPSY: CPT | Mod: PT,59 | Performed by: COLON & RECTAL SURGERY

## 2024-03-27 PROCEDURE — 45385 COLONOSCOPY W/LESION REMOVAL: CPT | Mod: PT,,, | Performed by: COLON & RECTAL SURGERY

## 2024-03-27 PROCEDURE — 37000008 HC ANESTHESIA 1ST 15 MINUTES: Performed by: COLON & RECTAL SURGERY

## 2024-03-27 RX ORDER — PROPOFOL 10 MG/ML
VIAL (ML) INTRAVENOUS
Status: DISCONTINUED | OUTPATIENT
Start: 2024-03-27 | End: 2024-03-27

## 2024-03-27 RX ORDER — LIDOCAINE HYDROCHLORIDE 10 MG/ML
INJECTION, SOLUTION EPIDURAL; INFILTRATION; INTRACAUDAL; PERINEURAL
Status: DISCONTINUED | OUTPATIENT
Start: 2024-03-27 | End: 2024-03-27

## 2024-03-27 RX ADMIN — LIDOCAINE HYDROCHLORIDE 50 MG: 10 SOLUTION INTRAVENOUS at 08:03

## 2024-03-27 RX ADMIN — PROPOFOL 100 MG: 10 INJECTION, EMULSION INTRAVENOUS at 08:03

## 2024-03-27 RX ADMIN — PROPOFOL 50 MG: 10 INJECTION, EMULSION INTRAVENOUS at 08:03

## 2024-03-27 RX ADMIN — SODIUM CHLORIDE, SODIUM LACTATE, POTASSIUM CHLORIDE, AND CALCIUM CHLORIDE: .6; .31; .03; .02 INJECTION, SOLUTION INTRAVENOUS at 08:03

## 2024-03-27 RX ADMIN — PROPOFOL 50 MG: 10 INJECTION, EMULSION INTRAVENOUS at 09:03

## 2024-03-27 NOTE — PLAN OF CARE
Dr Mercado came to bedside and discussed findings. NO N/V,  no abdominal pain, no GI bleeding, and vitals stable.  Pt discharged from unit.

## 2024-03-27 NOTE — ANESTHESIA POSTPROCEDURE EVALUATION
Anesthesia Post Evaluation    Patient: Pamela Lancaster    Procedure(s) Performed: Procedure(s) (LRB):  COLONOSCOPY (N/A)    Final Anesthesia Type: MAC      Patient location during evaluation: GI PACU  Patient participation: Yes- Able to Participate  Level of consciousness: awake and alert  Post-procedure vital signs: reviewed and stable  Pain management: adequate  Airway patency: patent    PONV status at discharge: No PONV  Anesthetic complications: no      Cardiovascular status: blood pressure returned to baseline  Respiratory status: unassisted and spontaneous ventilation  Hydration status: euvolemic  Follow-up not needed.              Vitals Value Taken Time   /88 03/27/24 0915   Temp 36.6 °C (97.8 °F) 03/27/24 0908   Pulse 72 03/27/24 0915   Resp 18 03/27/24 0915   SpO2 99 % 03/27/24 0915         Event Time   Out of Recovery 09:25:45         Pain/Nathaniel Score: Nathaniel Score: 10 (3/27/2024  9:15 AM)

## 2024-03-27 NOTE — ANESTHESIA PREPROCEDURE EVALUATION
03/27/2024  Pamela Lancaster is a 68 y.o., female.      Pre-op Assessment    I have reviewed the Patient Summary Reports.    I have reviewed the NPO Status.   I have reviewed the Medications.     Review of Systems  Anesthesia Hx:  No problems with previous Anesthesia                Cardiovascular:                hyperlipidemia                                 Physical Exam  General: Well nourished    Airway:  Mallampati: II   Mouth Opening: Normal  TM Distance: Normal  Tongue: Normal  Neck ROM: Normal ROM    Dental:  Intact    Chest/Lungs:  Normal Respiratory Rate    Heart:  Rate: Normal  Rhythm: Regular Rhythm    Anesthesia Plan  Type of Anesthesia, risks & benefits discussed:    Anesthesia Type: MAC  Intra-op Monitoring Plan: Standard ASA Monitors  Post Op Pain Control Plan: multimodal analgesia  Induction:  IV  Informed Consent: Informed consent signed with the Patient and all parties understand the risks and agree with anesthesia plan.  All questions answered.   ASA Score: 2  Day of Surgery Review of History & Physical: H&P Update referred to the surgeon/provider.    Ready For Surgery From Anesthesia Perspective.   .

## 2024-03-27 NOTE — TRANSFER OF CARE
"Anesthesia Transfer of Care Note    Patient: Pamela Lancaster    Procedure(s) Performed: Procedure(s) (LRB):  COLONOSCOPY (N/A)    Patient location: GI    Anesthesia Type: MAC    Transport from OR: Transported from OR on room air with adequate spontaneous ventilation    Post pain: adequate analgesia    Post assessment: no apparent anesthetic complications    Post vital signs: stable    Level of consciousness: sedated    Nausea/Vomiting: no nausea/vomiting    Complications: none    Transfer of care protocol was followed      Last vitals: Visit Vitals  BP (!) 147/84 (BP Location: Left arm, Patient Position: Lying)   Pulse 95   Temp 37.1 °C (98.8 °F) (Temporal)   Resp 18   Ht 5' 4" (1.626 m)   Wt 81.6 kg (180 lb)   LMP  (LMP Unknown)   SpO2 99%   Breastfeeding No   BMI 30.90 kg/m²     "

## 2024-03-27 NOTE — H&P
[facility] - Endoscopy ([facility])  Colon and Rectal Surgery  History & Physical    Patient Name: Pamela Lancaster  MRN: [id number]  Admission Date: 3/27/2024  Attending Physician: Tay Mercado MD  Primary Care Provider: Eloina, Primary Doctor    Patient information was obtained from patient and medical records.    Subjective:     Chief Complaint/Reason for Admission: Here for Colonoscopy    History of Present Illness:  Patient is a 68 y.o. female presents for colonoscopy. Reports having colonoscopy around 20yrs ago and normal. No hematochezia, melena or change in bowel habits. No personal or fam hx of CRC, polyps or IBD.    No current facility-administered medications on file prior to encounter.     Current Outpatient Medications on File Prior to Encounter   Medication Sig    atorvastatin (LIPITOR) 40 MG tablet Take 1 tablet (40 mg total) by mouth once daily.    cetirizine (ZYRTEC) 10 MG tablet Take 1 tablet (10 mg total) by mouth once daily.    cholecalciferol, vitamin D3, (VITAMIN D3) 50 mcg (2,000 unit) Tab Take 2,000 Units by mouth once daily.    cyanocobalamin (VITAMIN B-12) 1000 MCG tablet Take 1 tablet by mouth once daily.    hydroCHLOROthiazide (HYDRODIURIL) 50 MG tablet Take 0.5 tablets (25 mg total) by mouth once daily.    naproxen (NAPROSYN) 500 MG tablet Take 500 mg by mouth.    EPINEPHrine (EPIPEN JR) 0.15 mg/0.3 mL pen injection Inject 0.3 mLs (0.15 mg total) into the muscle as needed for Anaphylaxis.       Review of patient's allergies indicates:   Allergen Reactions    Iodine     Shellfish containing products        History reviewed. No pertinent past medical history.  Past Surgical History:   Procedure Laterality Date    JOINT REPLACEMENT       Family History       Problem Relation (Age of Onset)    No Known Problems Mother, Father          Tobacco Use    Smoking status: Never    Smokeless tobacco: Never   Substance and Sexual Activity    Alcohol use: Never    Drug use: Never    Sexual activity: Not  Currently     Review of Systems   Constitutional:  Negative for activity change, appetite change, chills, fatigue, fever and unexpected weight change.   HENT:  Negative for congestion, ear pain, sore throat and trouble swallowing.    Eyes:  Negative for pain, redness and itching.   Respiratory:  Negative for cough, shortness of breath and wheezing.    Cardiovascular:  Negative for chest pain, palpitations and leg swelling.   Gastrointestinal:  Negative for abdominal distention, abdominal pain, anal bleeding, blood in stool, constipation, diarrhea, nausea, rectal pain and vomiting.   Endocrine: Negative for cold intolerance, heat intolerance and polyuria.   Genitourinary:  Negative for dysuria, flank pain, frequency and hematuria.   Musculoskeletal:  Negative for gait problem, joint swelling and neck pain.   Skin:  Negative for color change, rash and wound.   Allergic/Immunologic: Negative for environmental allergies and immunocompromised state.   Neurological:  Negative for dizziness, speech difficulty, weakness and numbness.   Psychiatric/Behavioral:  Negative for agitation, confusion and hallucinations.      Objective:     Vital Signs (Most Recent):  Temp: 98.8 °F (37.1 °C) (03/27/24 0731)  Pulse: 95 (03/27/24 0731)  Resp: 18 (03/27/24 0731)  BP: (!) 147/84 (03/27/24 0731)  SpO2: 99 % (03/27/24 0731) Vital Signs (24h Range):  Temp:  [98.8 °F (37.1 °C)] 98.8 °F (37.1 °C)  Pulse:  [95] 95  Resp:  [18] 18  SpO2:  [99 %] 99 %  BP: (147)/(84) 147/84     Weight: 81.6 kg (180 lb)  Body mass index is 30.9 kg/m².    Physical Exam  Constitutional:       Appearance: She is well-developed.   HENT:      Head: Normocephalic and atraumatic.   Eyes:      Conjunctiva/sclera: Conjunctivae normal.   Neck:      Thyroid: No thyromegaly.   Cardiovascular:      Rate and Rhythm: Normal rate and regular rhythm.   Pulmonary:      Effort: Pulmonary effort is normal. No respiratory distress.   Abdominal:      General: There is no distension.       Palpations: Abdomen is soft. There is no mass.      Tenderness: There is no abdominal tenderness.   Musculoskeletal:         General: No tenderness. Normal range of motion.      Cervical back: Normal range of motion.   Skin:     General: Skin is warm and dry.      Capillary Refill: Capillary refill takes less than 2 seconds.      Findings: No rash.   Neurological:      Mental Status: She is alert and oriented to person, place, and time.           Assessment/Plan:     Patient is a 68 y.o. female who presents for colonoscopy     - Ok to proceed to endoscopy suite for colonoscopy  - Consent obtained. All risks, benefits and alternatives fully explained to patient, including but not limited to bleeding, infection, perforation, and missed polyps. All questions appropriately answered to patient's satisfaction. Consent signed and placed on chart.    There are no hospital problems to display for this patient.    VTE Risk Mitigation (From admission, onward)      None            Tay Mercado MD  Colon and Rectal Surgery  [facility] ([facility])

## 2024-03-27 NOTE — PROVATION PATIENT INSTRUCTIONS
Discharge Summary/Instructions after an Endoscopic Procedure  Patient Name: Pamela Lancaster  Patient MRN: [id number]  Patient YOB: 1955 Wednesday, March 27, 2024 Tay Mercado MD  Dear patient,  As a result of recent federal legislation (The Federal Cures Act), you may   receive lab or pathology results from your procedure in your [facility]   account before your physician is able to contact you. Your physician or   their representative will relay the results to you with their   recommendations at their soonest availability.  Thank you,  RESTRICTIONS:  During your procedure today, you received medications for sedation.  These   medications may affect your judgment, balance and coordination.  Therefore,   for 24 hours, you have the following restrictions:   - DO NOT drive a car, operate machinery, make legal/financial decisions,   sign important papers or drink alcohol.    ACTIVITY:  Today: no heavy lifting, straining or running due to procedural   sedation/anesthesia.  The following day: return to full activity including work.  DIET:  Eat and drink normally unless instructed otherwise.     TREATMENT FOR COMMON SIDE EFFECTS:  - Mild abdominal pain, nausea, belching, bloating or excessive gas:  rest,   eat lightly and use a heating pad.  - Sore Throat: treat with throat lozenges and/or gargle with warm salt   water.  - Because air was used during the procedure, expelling large amounts of air   from your rectum or belching is normal.  - If a bowel prep was taken, you may not have a bowel movement for 1-3 days.    This is normal.  SYMPTOMS TO WATCH FOR AND REPORT TO YOUR PHYSICIAN:  1. Abdominal pain or bloating, other than gas cramps.  2. Chest pain.  3. Back pain.  4. Signs of infection such as: chills or fever occurring within 24 hours   after the procedure.  5. Rectal bleeding, which would show as bright red, maroon, or black stools.   (A tablespoon of blood from the rectum is not serious,  especially if   hemorrhoids are present.)  6. Vomiting.  7. Weakness or dizziness.  GO DIRECTLY TO THE NEAREST EMERGENCY ROOM IF YOU HAVE ANY OF THE FOLLOWING:      Difficulty breathing              Chills and/or fever over 101 F   Persistent vomiting and/or vomiting blood   Severe abdominal pain   Severe chest pain   Black, tarry stools   Bleeding- more than one tablespoon   Any other symptom or condition that you feel may need urgent attention  Your doctor recommends these additional instructions:  If any biopsies were taken, your doctors clinic will contact you in 1 to 2   weeks with any results.  - Discharge patient to home.   - High fiber diet.   - Continue present medications.   - Await pathology results.   - Repeat colonoscopy in 5 years for surveillance.   - Return to primary care physician PRN.  For questions, problems or results please call your physician Tay Mercado MD at Work:  [phone]  If you have any questions about the above instructions, call the GI   department at [phone] or call the endoscopy unit at [phone]   from 7am until 3 pm.  [facility] [place], EMERGENCY ROOM PHONE NUMBER:   [phone]  IF A COMPLICATION OR EMERGENCY SITUATION ARISES AND YOU ARE UNABLE TO REACH   YOUR PHYSICIAN - GO DIRECTLY TO THE EMERGENCY ROOM.  I have read or have had read to me these discharge instructions for my   procedure and have received a written copy.  I understand these   instructions and will follow-up with my physician if I have any questions.     __________________________________       _____________________________________  Nurse Signature                                          Patient/Designated   Responsible Party Signature  MD Tay Delacruz MD  3/27/2024 9:15:03 AM  This report has been verified and signed electronically.  Dear patient,  As a result of recent federal legislation (The Federal Cures Act), you may   receive lab or  pathology results from your procedure in your [facility]   account before your physician is able to contact you. Your physician or   their representative will relay the results to you with their   recommendations at their soonest availability.  Thank you,  PROVATION

## 2024-03-27 NOTE — BRIEF OP NOTE
[facility] (Hospital)  Brief Operative Note     SUMMARY     Surgery Date: 3/27/2024     Surgeon(s) and Role:     * Gideon Mercado MD - Primary    Assisting Surgeon: None    Pre-op Diagnosis:  Colon cancer screening [Z12.11]    Post-op Diagnosis:  Post-Op Diagnosis Codes:     * Colon cancer screening [Z12.11]    Procedure(s) (LRB):  COLONOSCOPY (N/A)    Anesthesia: Choice    Description of the findings of the procedure: two polyps removed    Estimated Blood Loss: * No values recorded between 3/27/2024  8:42 AM and 3/27/2024  9:05 AM *         Specimens:   Specimen (24h ago, onward)       Start     Ordered    03/27/24 0904  Specimen to Pathology, Surgery Gastrointestinal tract  Once        Comments: Pre-op Diagnosis: Colon cancer screening [Z12.11]Procedure(s):COLONOSCOPY 1. Sigmoid Polypectomy x2     References:    Click here for ordering Quick Tip   Question Answer Comment   Procedure Type: Gastrointestinal tract    Which provider would you like to cc? GIDEON MERCADO    Release to patient Immediate        03/27/24 0904                    Discharge Note    SUMMARY     Admit Date: 3/27/2024    Discharge Date and Time: 3/27/2024 9:05 AM    Hospital Course Patient was seen in the preoperative area by both myself and anesthesia. All consents were verified and all questions appropriately answered. All risks, benefits and alternatives explained to patient. Patient proceeded to endoscopy suite for colonoscopy and was discharged home postoperative once cleared by anesthesia.    Final Diagnosis: Post-Op Diagnosis Codes:     * Colon cancer screening [Z12.11]    Disposition: Home or Self Care    Follow Up/Patient Instructions: See Provation report    Medications:  Reconciled Home Medications:      Medication List        CONTINUE taking these medications      atorvastatin 40 MG tablet  Commonly known as: LIPITOR  Take 1 tablet (40 mg total) by mouth once daily.     cetirizine 10 MG tablet  Commonly known as:  ZYRTEC  Take 1 tablet (10 mg total) by mouth once daily.     cholecalciferol (vitamin D3) 50 mcg (2,000 unit) Tab  Commonly known as: VITAMIN D3  Take 2,000 Units by mouth once daily.     cyanocobalamin 1000 MCG tablet  Commonly known as: VITAMIN B-12  Take 1 tablet by mouth once daily.     EPINEPHrine 0.15 mg/0.3 mL pen injection  Commonly known as: EPIPEN JR  Inject 0.3 mLs (0.15 mg total) into the muscle as needed for Anaphylaxis.     hydroCHLOROthiazide 50 MG tablet  Commonly known as: HYDRODIURIL  Take 0.5 tablets (25 mg total) by mouth once daily.     naproxen 500 MG tablet  Commonly known as: NAPROSYN  Take 500 mg by mouth.            Discharge Procedure Orders   Diet general     Call MD for:  temperature >100.4     Call MD for:  persistent nausea and vomiting     Call MD for:  severe uncontrolled pain     Call MD for:  difficulty breathing, headache or visual disturbances     Call MD for:  redness, tenderness, or signs of infection (pain, swelling, redness, odor or green/yellow discharge around incision site)     Call MD for:  hives     Call MD for:  persistent dizziness or light-headedness     Call MD for:  extreme fatigue     Activity as tolerated      Follow-up Information       No, Primary Doctor Follow up.    Why: As needed

## 2024-03-28 VITALS
WEIGHT: 180 LBS | HEIGHT: 64 IN | DIASTOLIC BLOOD PRESSURE: 88 MMHG | TEMPERATURE: 98 F | HEART RATE: 72 BPM | OXYGEN SATURATION: 99 % | SYSTOLIC BLOOD PRESSURE: 105 MMHG | RESPIRATION RATE: 18 BRPM | BODY MASS INDEX: 30.73 KG/M2

## 2024-04-01 LAB
FINAL PATHOLOGIC DIAGNOSIS: NORMAL
GROSS: NORMAL
Lab: NORMAL

## 2024-05-01 ENCOUNTER — HOSPITAL ENCOUNTER (OUTPATIENT)
Dept: RADIOLOGY | Facility: HOSPITAL | Age: 69
Discharge: HOME | End: 2024-05-01
Attending: PEDIATRICS
Payer: MEDICARE

## 2024-05-01 ENCOUNTER — OFFICE VISIT (OUTPATIENT)
Dept: INTERNAL MEDICINE | Facility: CLINIC | Age: 69
End: 2024-05-01
Payer: MEDICARE

## 2024-05-01 VITALS
HEART RATE: 72 BPM | RESPIRATION RATE: 17 BRPM | DIASTOLIC BLOOD PRESSURE: 78 MMHG | HEIGHT: 64 IN | WEIGHT: 185.44 LBS | BODY MASS INDEX: 31.66 KG/M2 | OXYGEN SATURATION: 98 % | TEMPERATURE: 98 F | SYSTOLIC BLOOD PRESSURE: 124 MMHG

## 2024-05-01 DIAGNOSIS — R07.81 PLEURITIC CHEST PAIN: Primary | ICD-10-CM

## 2024-05-01 DIAGNOSIS — M15.9 PRIMARY OSTEOARTHRITIS INVOLVING MULTIPLE JOINTS: ICD-10-CM

## 2024-05-01 DIAGNOSIS — R07.81 PLEURITIC CHEST PAIN: ICD-10-CM

## 2024-05-01 PROCEDURE — 71046 X-RAY EXAM CHEST 2 VIEWS: CPT | Mod: TC

## 2024-05-01 PROCEDURE — 99999 PR PBB SHADOW E&M-EST. PATIENT-LVL IV: CPT | Mod: PBBFAC,,, | Performed by: PEDIATRICS

## 2024-05-01 PROCEDURE — 99214 OFFICE O/P EST MOD 30 MIN: CPT | Mod: PBBFAC,25 | Performed by: PEDIATRICS

## 2024-05-01 PROCEDURE — 99214 OFFICE O/P EST MOD 30 MIN: CPT | Mod: S$PBB,,, | Performed by: PEDIATRICS

## 2024-05-01 PROCEDURE — 71046 X-RAY EXAM CHEST 2 VIEWS: CPT | Mod: 26,,, | Performed by: RADIOLOGY

## 2024-05-01 RX ORDER — TIZANIDINE 4 MG/1
4 TABLET ORAL EVERY 8 HOURS PRN
Qty: 30 TABLET | Refills: 1 | Status: SHIPPED | OUTPATIENT
Start: 2024-05-01

## 2024-05-01 RX ORDER — DICLOFENAC SODIUM 30 MG/G
GEL TOPICAL
Qty: 200 G | Refills: 11 | Status: SHIPPED | OUTPATIENT
Start: 2024-05-01

## 2024-05-01 RX ORDER — MELOXICAM 15 MG/1
15 TABLET ORAL DAILY
Qty: 30 TABLET | Refills: 11 | Status: SHIPPED | OUTPATIENT
Start: 2024-05-01

## 2024-05-01 NOTE — PROGRESS NOTES
Subjective     Patient ID: Pamela Lancaster is a 68 y.o. female.    Chief Complaint: Shoulder Pain    Pamela Lancaster is a 68 year old female here for back/neck pain which has been ongoing 1-2 months. Pain is exacerbated by coughing and sneezing. Pt denies any trauma or injury. She has chronic shoulder and neck arthritis but this feels different. Not taking anything for this.    Back Pain  This is a new problem. The current episode started 1 to 4 weeks ago. The problem occurs constantly. The problem has been gradually worsening since onset. The pain is present in the thoracic spine. The quality of the pain is described as stabbing. The pain does not radiate. The pain is at a severity of 8/10. The pain is moderate. The pain is The same all the time. The symptoms are aggravated by coughing and position. Stiffness is present All day. Associated symptoms include chest pain, numbness, paresthesias, tingling and weakness. Pertinent negatives include no abdominal pain, bladder incontinence, bowel incontinence, dysuria, fever, headaches, leg pain, paresis, pelvic pain, perianal numbness or weight loss. The treatment provided mild relief.   Shoulder Pain   Associated symptoms include numbness and tingling. Pertinent negatives include no fever or headaches.     Review of Systems   Constitutional:  Negative for chills, fever and weight loss.   HENT:  Negative for nasal congestion and rhinorrhea.    Respiratory:  Negative for cough and shortness of breath.    Cardiovascular:  Positive for chest pain.   Gastrointestinal:  Negative for abdominal pain, blood in stool, bowel incontinence, change in bowel habit, constipation, diarrhea and nausea.   Endocrine: Negative for cold intolerance, heat intolerance, polydipsia, polyphagia and polyuria.   Genitourinary:  Negative for bladder incontinence, dysuria, hematuria and pelvic pain.   Musculoskeletal:  Positive for back pain and neck pain. Negative for leg pain.    Neurological:  Positive for tingling, weakness, numbness and paresthesias. Negative for headaches.          Objective     Physical Exam  Constitutional:       General: She is not in acute distress.     Appearance: Normal appearance. She is normal weight. She is not ill-appearing or toxic-appearing.   Cardiovascular:      Rate and Rhythm: Normal rate and regular rhythm.      Pulses: Normal pulses.      Heart sounds: Normal heart sounds. No murmur heard.     No gallop.   Pulmonary:      Effort: Pulmonary effort is normal. No respiratory distress.      Breath sounds: Normal breath sounds. No stridor. No wheezing, rhonchi or rales.   Chest:      Chest wall: No tenderness.   Abdominal:      General: There is no distension.      Palpations: There is no mass.      Tenderness: There is no abdominal tenderness.      Hernia: No hernia is present.   Musculoskeletal:         General: Tenderness (tender across all of chest and upper back, she has trapezius, rhomboid, and subscapular muscle tightness, no rashes, no limitations of ROM of shoulders or neck) present.   Neurological:      General: No focal deficit present.      Mental Status: She is alert and oriented to person, place, and time. Mental status is at baseline.      Cranial Nerves: No cranial nerve deficit.      Motor: No weakness.      Gait: Gait normal.   Psychiatric:         Mood and Affect: Mood normal.         Behavior: Behavior normal.         Thought Content: Thought content normal.         Judgment: Judgment normal.            Assessment and Plan     1. Pleuritic chest pain  -     X-Ray Chest PA And Lateral; Future; Expected date: 05/01/2024    2. Primary osteoarthritis involving multiple joints    Other orders  -     diclofenac sodium (SOLARAZE) 3 % gel; Apply up to 4 times a day.  Dispense: 200 g; Refill: 11  -     tiZANidine (ZANAFLEX) 4 MG tablet; Take 1 tablet (4 mg total) by mouth every 8 (eight) hours as needed.  Dispense: 30 tablet; Refill: 1  -      meloxicam (MOBIC) 15 MG tablet; Take 1 tablet (15 mg total) by mouth once daily.  Dispense: 30 tablet; Refill: 11        Chest Xray did not show any pneumonia or fractures, but pt does have arthritis. OTC tumeric, glucosamine, and sports gels/creams. Meloxicam, tizanidine, and diclofenac gel prescribed. If sxs do not improve, consult Dr. Liriano. Side effects of medications discussed.         No follow-ups on file.

## 2024-05-17 ENCOUNTER — PATIENT MESSAGE (OUTPATIENT)
Dept: RESEARCH | Facility: HOSPITAL | Age: 69
End: 2024-05-17
Payer: MEDICARE

## 2024-06-21 ENCOUNTER — TELEPHONE (OUTPATIENT)
Dept: INTERNAL MEDICINE | Facility: CLINIC | Age: 69
End: 2024-06-21
Payer: MEDICARE

## 2024-08-30 ENCOUNTER — LAB VISIT (OUTPATIENT)
Dept: LAB | Facility: HOSPITAL | Age: 69
End: 2024-08-30
Attending: PEDIATRICS
Payer: MEDICARE

## 2024-08-30 DIAGNOSIS — Z11.59 ENCOUNTER FOR HEPATITIS C SCREENING TEST FOR LOW RISK PATIENT: ICD-10-CM

## 2024-08-30 DIAGNOSIS — R73.09 ELEVATED GLUCOSE: ICD-10-CM

## 2024-08-30 DIAGNOSIS — E78.00 HYPERCHOLESTEROLEMIA: ICD-10-CM

## 2024-08-30 LAB
ALBUMIN SERPL BCP-MCNC: 3.4 G/DL (ref 3.5–5.2)
ALP SERPL-CCNC: 140 U/L (ref 55–135)
ALT SERPL W/O P-5'-P-CCNC: 27 U/L (ref 10–44)
ANION GAP SERPL CALC-SCNC: 11 MMOL/L (ref 8–16)
AST SERPL-CCNC: 45 U/L (ref 10–40)
BASOPHILS # BLD AUTO: 0.06 K/UL (ref 0–0.2)
BASOPHILS NFR BLD: 1 % (ref 0–1.9)
BILIRUB SERPL-MCNC: 0.4 MG/DL (ref 0.1–1)
BUN SERPL-MCNC: 19 MG/DL (ref 8–23)
CALCIUM SERPL-MCNC: 9.6 MG/DL (ref 8.7–10.5)
CHLORIDE SERPL-SCNC: 106 MMOL/L (ref 95–110)
CHOLEST SERPL-MCNC: 128 MG/DL (ref 120–199)
CHOLEST/HDLC SERPL: 3 {RATIO} (ref 2–5)
CO2 SERPL-SCNC: 25 MMOL/L (ref 23–29)
CREAT SERPL-MCNC: 0.9 MG/DL (ref 0.5–1.4)
DIFFERENTIAL METHOD BLD: ABNORMAL
EOSINOPHIL # BLD AUTO: 0.1 K/UL (ref 0–0.5)
EOSINOPHIL NFR BLD: 2.3 % (ref 0–8)
ERYTHROCYTE [DISTWIDTH] IN BLOOD BY AUTOMATED COUNT: 17.4 % (ref 11.5–14.5)
EST. GFR  (NO RACE VARIABLE): >60 ML/MIN/1.73 M^2
ESTIMATED AVG GLUCOSE: 131 MG/DL (ref 68–131)
GLUCOSE SERPL-MCNC: 92 MG/DL (ref 70–110)
HBA1C MFR BLD: 6.2 % (ref 4–5.6)
HCT VFR BLD AUTO: 39.5 % (ref 37–48.5)
HCV AB SERPL QL IA: NORMAL
HDLC SERPL-MCNC: 42 MG/DL (ref 40–75)
HDLC SERPL: 32.8 % (ref 20–50)
HGB BLD-MCNC: 11.5 G/DL (ref 12–16)
IMM GRANULOCYTES # BLD AUTO: 0.01 K/UL (ref 0–0.04)
IMM GRANULOCYTES NFR BLD AUTO: 0.2 % (ref 0–0.5)
LDLC SERPL CALC-MCNC: 71 MG/DL (ref 63–159)
LYMPHOCYTES # BLD AUTO: 2.7 K/UL (ref 1–4.8)
LYMPHOCYTES NFR BLD: 44.9 % (ref 18–48)
MCH RBC QN AUTO: 24.5 PG (ref 27–31)
MCHC RBC AUTO-ENTMCNC: 29.1 G/DL (ref 32–36)
MCV RBC AUTO: 84 FL (ref 82–98)
MONOCYTES # BLD AUTO: 0.7 K/UL (ref 0.3–1)
MONOCYTES NFR BLD: 10.8 % (ref 4–15)
NEUTROPHILS # BLD AUTO: 2.5 K/UL (ref 1.8–7.7)
NEUTROPHILS NFR BLD: 40.8 % (ref 38–73)
NONHDLC SERPL-MCNC: 86 MG/DL
NRBC BLD-RTO: 0 /100 WBC
PLATELET # BLD AUTO: 388 K/UL (ref 150–450)
PMV BLD AUTO: 11.1 FL (ref 9.2–12.9)
POTASSIUM SERPL-SCNC: 3.9 MMOL/L (ref 3.5–5.1)
PROT SERPL-MCNC: 7 G/DL (ref 6–8.4)
RBC # BLD AUTO: 4.7 M/UL (ref 4–5.4)
SODIUM SERPL-SCNC: 142 MMOL/L (ref 136–145)
TRIGL SERPL-MCNC: 75 MG/DL (ref 30–150)
WBC # BLD AUTO: 6.02 K/UL (ref 3.9–12.7)

## 2024-08-30 PROCEDURE — 80053 COMPREHEN METABOLIC PANEL: CPT | Performed by: PEDIATRICS

## 2024-08-30 PROCEDURE — 36415 COLL VENOUS BLD VENIPUNCTURE: CPT | Performed by: PEDIATRICS

## 2024-08-30 PROCEDURE — 83036 HEMOGLOBIN GLYCOSYLATED A1C: CPT | Performed by: PEDIATRICS

## 2024-08-30 PROCEDURE — 80061 LIPID PANEL: CPT | Performed by: PEDIATRICS

## 2024-08-30 PROCEDURE — 86803 HEPATITIS C AB TEST: CPT | Performed by: PEDIATRICS

## 2024-08-30 PROCEDURE — 85025 COMPLETE CBC W/AUTO DIFF WBC: CPT | Performed by: PEDIATRICS

## 2024-09-09 ENCOUNTER — OFFICE VISIT (OUTPATIENT)
Dept: INTERNAL MEDICINE | Facility: CLINIC | Age: 69
End: 2024-09-09
Payer: MEDICARE

## 2024-09-09 VITALS
BODY MASS INDEX: 33.12 KG/M2 | SYSTOLIC BLOOD PRESSURE: 122 MMHG | OXYGEN SATURATION: 97 % | DIASTOLIC BLOOD PRESSURE: 78 MMHG | HEIGHT: 64 IN | TEMPERATURE: 97 F | WEIGHT: 194 LBS | HEART RATE: 70 BPM

## 2024-09-09 DIAGNOSIS — R73.03 PREDIABETES: ICD-10-CM

## 2024-09-09 DIAGNOSIS — E78.00 HYPERCHOLESTEROLEMIA: ICD-10-CM

## 2024-09-09 DIAGNOSIS — M15.9 PRIMARY OSTEOARTHRITIS INVOLVING MULTIPLE JOINTS: Primary | ICD-10-CM

## 2024-09-09 DIAGNOSIS — M54.32 SCIATICA, LEFT SIDE: ICD-10-CM

## 2024-09-09 PROCEDURE — 96372 THER/PROPH/DIAG INJ SC/IM: CPT | Mod: PBBFAC

## 2024-09-09 PROCEDURE — 99999 PR PBB SHADOW E&M-EST. PATIENT-LVL IV: CPT | Mod: PBBFAC,,, | Performed by: NURSE PRACTITIONER

## 2024-09-09 PROCEDURE — G2211 COMPLEX E/M VISIT ADD ON: HCPCS | Mod: S$PBB,,, | Performed by: NURSE PRACTITIONER

## 2024-09-09 PROCEDURE — 99214 OFFICE O/P EST MOD 30 MIN: CPT | Mod: PBBFAC | Performed by: NURSE PRACTITIONER

## 2024-09-09 PROCEDURE — 99214 OFFICE O/P EST MOD 30 MIN: CPT | Mod: S$PBB,,, | Performed by: NURSE PRACTITIONER

## 2024-09-09 PROCEDURE — 99999PBSHW PR PBB SHADOW TECHNICAL ONLY FILED TO HB: Mod: PBBFAC,,,

## 2024-09-09 RX ORDER — KETOROLAC TROMETHAMINE 30 MG/ML
30 INJECTION, SOLUTION INTRAMUSCULAR; INTRAVENOUS
Status: COMPLETED | OUTPATIENT
Start: 2024-09-09 | End: 2024-09-09

## 2024-09-09 RX ADMIN — KETOROLAC TROMETHAMINE 30 MG: 30 INJECTION, SOLUTION INTRAMUSCULAR; INTRAVENOUS at 09:09

## 2024-09-09 NOTE — PROGRESS NOTES
Patient was administered 30 mg of toradol. Patient tolerated well with no complaints nor concerns. Patient's pain level was an 8 before injection was administered and after 15 minutes, it can down to a pain level of 4.

## 2024-09-09 NOTE — PROGRESS NOTES
Subjective:       Patient ID: Pamela Lancaster is a 68 y.o. female.    Chief Complaint: Follow-up    HPI    1) LIPIDS: on lipitor, trying to follow D&E  2) Fluid retention in lower extremities s/p knee replacement: on HCTZ  3) Shellfish Allergy: has epipen  4) GYN: [facility]  5) Osteoarthritis: neck/shoulder pain  6) Obesity: s/p bariatric surgery, had diagnosis of HTN with weight loss is off lisinopril    History reviewed. No pertinent past medical history.                Review of Systems   Constitutional:  Negative for activity change, appetite change, chills, diaphoresis, fatigue, fever and unexpected weight change.   HENT:  Negative for congestion, ear pain, postnasal drip, rhinorrhea, sinus pressure, sinus pain, sneezing, sore throat, tinnitus, trouble swallowing and voice change.    Eyes:  Negative for photophobia, pain and visual disturbance.   Respiratory:  Negative for cough, chest tightness, shortness of breath and wheezing.    Cardiovascular:  Negative for chest pain, palpitations and leg swelling.   Gastrointestinal:  Negative for abdominal distention, abdominal pain, constipation, diarrhea, nausea and vomiting.   Genitourinary:  Negative for decreased urine volume, difficulty urinating, dysuria, flank pain, frequency, hematuria and urgency.   Musculoskeletal:  Positive for myalgias. Negative for arthralgias, back pain, joint swelling, neck pain and neck stiffness.   Allergic/Immunologic: Negative for immunocompromised state.   Neurological:  Negative for dizziness, tremors, seizures, syncope, facial asymmetry, speech difficulty, weakness, light-headedness, numbness and headaches.   Hematological:  Negative for adenopathy. Does not bruise/bleed easily.   Psychiatric/Behavioral:  Negative for confusion and sleep disturbance.        Objective:      Physical Exam  Vitals reviewed.   Constitutional:       Appearance: She is obese.   Cardiovascular:      Rate and Rhythm: Normal rate and  regular rhythm.      Heart sounds: Normal heart sounds.   Pulmonary:      Effort: Pulmonary effort is normal.      Breath sounds: Normal breath sounds.   Abdominal:      General: Bowel sounds are normal.      Palpations: Abdomen is soft.   Musculoskeletal:        Legs:       Comments: TTP   Skin:     General: Skin is warm and dry.   Neurological:      Mental Status: She is alert and oriented to person, place, and time.         Assessment:     Vitals:    09/09/24 0816   BP: 122/78   Pulse: 70   Temp: 97.4 °F (36.3 °C)         1. Primary osteoarthritis involving multiple joints    2. Hypercholesterolemia    3. Sciatica, left side    4. Prediabetes        Plan:   Primary osteoarthritis involving multiple joints    Hypercholesterolemia  -     Lipid Panel; Future; Expected date: 03/08/2025    Sciatica, left side  -     ketorolac injection 30 mg    Prediabetes  -     Comprehensive Metabolic Panel; Future; Expected date: 03/08/2025  -     Lipid Panel; Future; Expected date: 03/08/2025  -     CBC Auto Differential; Future; Expected date: 03/08/2025  -     Hemoglobin A1C; Future; Expected date: 03/08/2025  -     TSH; Future; Expected date: 03/08/2025      Toradol shot for sciatic nerve pain/Toradol injection now/printout given to patient for stretches   We discussed prediabetes in detail printout also given she declines nutritionist at this time  She is also given a printout on iron rich diet  Patient had mammogram done an excellent facility she gets the MRI and mammogram every 6 months  Return in 6 months with labs  Chronic care management for multiple complex problems addressed at today's visit

## 2024-09-16 DIAGNOSIS — M54.32 LEFT SIDED SCIATICA: Primary | ICD-10-CM

## 2024-09-17 ENCOUNTER — TELEPHONE (OUTPATIENT)
Dept: PAIN MEDICINE | Facility: CLINIC | Age: 69
End: 2024-09-17
Payer: MEDICARE

## 2024-09-17 NOTE — TELEPHONE ENCOUNTER
----- Message from Valente Carmichael LPN sent at 9/16/2024  3:43 PM CDT -----  Regarding: needs appt for sciatica pain  Good afternoon. Could you please contact pt. To schedule? Thank you./CS

## 2024-09-17 NOTE — TELEPHONE ENCOUNTER
Reached out to patient to schedule appointment from messages. Apt has been made.   Pt understand. All questions answered.     Aurelio Hilario  Medical Assistant

## 2024-09-18 ENCOUNTER — HOSPITAL ENCOUNTER (OUTPATIENT)
Dept: RADIOLOGY | Facility: HOSPITAL | Age: 69
Discharge: HOME | End: 2024-09-18
Attending: ANESTHESIOLOGY
Payer: MEDICARE

## 2024-09-18 ENCOUNTER — OFFICE VISIT (OUTPATIENT)
Dept: PAIN MEDICINE | Facility: CLINIC | Age: 69
End: 2024-09-18
Payer: MEDICARE

## 2024-09-18 VITALS
SYSTOLIC BLOOD PRESSURE: 109 MMHG | HEART RATE: 89 BPM | RESPIRATION RATE: 16 BRPM | DIASTOLIC BLOOD PRESSURE: 78 MMHG | WEIGHT: 189.13 LBS | HEIGHT: 64 IN | BODY MASS INDEX: 32.29 KG/M2

## 2024-09-18 DIAGNOSIS — M46.1 SACROILIITIS: ICD-10-CM

## 2024-09-18 DIAGNOSIS — M47.816 LUMBAR SPONDYLOSIS: ICD-10-CM

## 2024-09-18 DIAGNOSIS — M54.16 LUMBAR RADICULOPATHY: ICD-10-CM

## 2024-09-18 DIAGNOSIS — M54.9 DORSALGIA, UNSPECIFIED: ICD-10-CM

## 2024-09-18 DIAGNOSIS — M51.36 DDD (DEGENERATIVE DISC DISEASE), LUMBAR: ICD-10-CM

## 2024-09-18 DIAGNOSIS — M54.32 LEFT SIDED SCIATICA: ICD-10-CM

## 2024-09-18 DIAGNOSIS — M54.16 LUMBAR RADICULOPATHY, CHRONIC: ICD-10-CM

## 2024-09-18 DIAGNOSIS — M54.16 LUMBAR RADICULOPATHY: Primary | ICD-10-CM

## 2024-09-18 PROBLEM — I10 ESSENTIAL (PRIMARY) HYPERTENSION: Status: ACTIVE | Noted: 2024-09-18

## 2024-09-18 PROBLEM — M25.562 ARTHRALGIA OF BOTH KNEES: Status: ACTIVE | Noted: 2024-09-18

## 2024-09-18 PROBLEM — G56.03 BILATERAL CARPAL TUNNEL SYNDROME: Status: ACTIVE | Noted: 2024-09-18

## 2024-09-18 PROBLEM — M25.561 ARTHRALGIA OF BOTH KNEES: Status: ACTIVE | Noted: 2024-09-18

## 2024-09-18 PROBLEM — E53.9 VITAMIN B DEFICIENCY: Status: ACTIVE | Noted: 2024-09-18

## 2024-09-18 PROBLEM — H40.013 OPEN ANGLE WITH BORDERLINE FINDINGS, LOW RISK, BILATERAL: Status: ACTIVE | Noted: 2024-09-18

## 2024-09-18 PROBLEM — E66.9 OBESITY: Status: ACTIVE | Noted: 2024-09-18

## 2024-09-18 PROBLEM — Q89.01 ASPLENIA: Status: ACTIVE | Noted: 2024-09-18

## 2024-09-18 PROBLEM — T78.02XA ANAPHYLACTIC SHOCK DUE TO SHELLFISH: Status: ACTIVE | Noted: 2024-09-18

## 2024-09-18 PROBLEM — J30.9 ALLERGIC RHINITIS: Status: ACTIVE | Noted: 2024-09-18

## 2024-09-18 PROCEDURE — 72114 X-RAY EXAM L-S SPINE BENDING: CPT | Mod: 26,,, | Performed by: RADIOLOGY

## 2024-09-18 PROCEDURE — 99215 OFFICE O/P EST HI 40 MIN: CPT | Mod: PBBFAC,25 | Performed by: ANESTHESIOLOGY

## 2024-09-18 PROCEDURE — 72114 X-RAY EXAM L-S SPINE BENDING: CPT | Mod: TC

## 2024-09-18 PROCEDURE — 99204 OFFICE O/P NEW MOD 45 MIN: CPT | Mod: S$PBB,,, | Performed by: ANESTHESIOLOGY

## 2024-09-18 PROCEDURE — 99999 PR PBB SHADOW E&M-EST. PATIENT-LVL V: CPT | Mod: PBBFAC,,, | Performed by: ANESTHESIOLOGY

## 2024-09-18 RX ORDER — PREGABALIN 50 MG/1
50 CAPSULE ORAL 2 TIMES DAILY
Qty: 60 CAPSULE | Refills: 1 | Status: SHIPPED | OUTPATIENT
Start: 2024-09-18

## 2024-09-18 NOTE — PROGRESS NOTES
New Patient Interventional Pain Note (Initial Visit)    Referring Physician: Ariana Lowery    PCP: Buddy Ortiz MD    Chief Complaint:  Lower back pain       SUBJECTIVE:    Pamela Lancaster is a 68 y.o. female who presents to the clinic for the evaluation of lower back pain.   Patient reports 2 month history lower back pain.  Patient denies any previous surgeries on her lumbar spine.  Patient reports having previous bilateral TKA.  Patient denies any significant inciting traumas to her lower back pain.    Pain is described as a aching stabbing pain that starts in the left lower back and left buttocks area.  This pain then radiates to the left groin and along the anterior aspect and lateral aspect to the middle of the left thigh.  Patient denies any significant right lower extremity pain.  Pain is worse with extension prolonged walking, better with sitting down and rest.  Pain is currently rated an 8/10.  Patient denies any fevers, chills, saddle anesthesia, or bowel and bladder incontinence.      Non-Pharmacologic Treatments:  Physical Therapy/Home Exercise: yes  Ice/Heat:yes  TENS: no  Acupuncture: no  Massage: yes  Chiropractic: no        Previous Pain Medications:  Tylenol, ibuprofen, meloxicam, gabapentin, tizanidine, Flexeril, topicals       report:  Reviewed and consistent with medication use as prescribed.    Pain Procedures:   none    Pain Disability Index Review:         9/18/2024    12:10 PM   Last 3 PDI Scores   Pain Disability Index (PDI) 63       Imaging:     No pertinent imaging on file      History reviewed. No pertinent past medical history.  Past Surgical History:   Procedure Laterality Date    COLONOSCOPY N/A 3/27/2024    Procedure: COLONOSCOPY;  Surgeon: Tay Mercado MD;  Location: [facility];  Service: General;  Laterality: N/A;    JOINT REPLACEMENT       Social History     Socioeconomic History    Marital status:    Tobacco Use    Smoking status: Never     Smokeless tobacco: Never   Substance and Sexual Activity    Alcohol use: Never    Drug use: Never    Sexual activity: Not Currently     Social Determinants of Health     Financial Resource Strain: Low Risk  (4/28/2024)    Overall Financial Resource Strain (CARDIA)     Difficulty of Paying Living Expenses: Not hard at all   Food Insecurity: No Food Insecurity (4/28/2024)    Hunger Vital Sign     Worried About Running Out of Food in the Last Year: Never true     Ran Out of Food in the Last Year: Never true   Transportation Needs: No Transportation Needs (4/28/2024)    PRAPARE - Transportation     Lack of Transportation (Medical): No     Lack of Transportation (Non-Medical): No   Physical Activity: Sufficiently Active (4/28/2024)    Exercise Vital Sign     Days of Exercise per Week: 6 days     Minutes of Exercise per Session: 30 min   Stress: No Stress Concern Present (4/28/2024)    [employer] - Occupational Stress Questionnaire     Feeling of Stress : Not at all   Housing Stability: Unknown (4/28/2024)    Housing Stability Vital Sign     Unable to Pay for Housing in the Last Year: No     Family History   Problem Relation Name Age of Onset    No Known Problems Mother      No Known Problems Father         Review of patient's allergies indicates:   Allergen Reactions    Shellfish containing products Rash and Swelling     Other Reaction(s): Not available    Iodine      Other Reaction(s): Not available       Current Outpatient Medications   Medication Sig    atorvastatin (LIPITOR) 40 MG tablet Take 1 tablet (40 mg total) by mouth once daily.    cetirizine (ZYRTEC) 10 MG tablet Take 1 tablet (10 mg total) by mouth once daily.    cholecalciferol, vitamin D3, (VITAMIN D3) 50 mcg (2,000 unit) Tab Take 2,000 Units by mouth once daily.    cyanocobalamin (VITAMIN B-12) 1000 MCG tablet Take 1 tablet by mouth once daily.    diclofenac sodium (SOLARAZE) 3 % gel Apply up to 4 times a day.    EPINEPHrine  "(EPIPEN JR) 0.15 mg/0.3 mL pen injection Inject 0.3 mLs (0.15 mg total) into the muscle as needed for Anaphylaxis.    hydroCHLOROthiazide (HYDRODIURIL) 50 MG tablet Take 0.5 tablets (25 mg total) by mouth once daily.    meloxicam (MOBIC) 15 MG tablet Take 1 tablet (15 mg total) by mouth once daily.    pregabalin (LYRICA) 50 MG capsule Take 1 capsule (50 mg total) by mouth 2 (two) times daily.     No current facility-administered medications for this visit.         ROS  Review of Systems   Constitutional:  Negative for chills, diaphoresis, fatigue and fever.   HENT:  Negative for ear discharge, ear pain, rhinorrhea, trouble swallowing and voice change.    Eyes:  Negative for pain and redness.   Respiratory:  Negative for chest tightness, shortness of breath, wheezing and stridor.    Cardiovascular:  Negative for chest pain and leg swelling.   Gastrointestinal:  Negative for blood in stool, diarrhea, nausea and vomiting.   Endocrine: Negative for cold intolerance and heat intolerance.   Genitourinary:  Negative for dysuria, hematuria and urgency.   Musculoskeletal:  Positive for arthralgias, back pain, gait problem and myalgias. Negative for joint swelling, neck pain and neck stiffness.   Skin:  Negative for rash.   Neurological:  Positive for weakness and numbness. Negative for tremors, seizures, speech difficulty, light-headedness and headaches.   Hematological:  Does not bruise/bleed easily.   Psychiatric/Behavioral:  Negative for agitation, confusion and suicidal ideas. The patient is nervous/anxious.             OBJECTIVE:  /78   Pulse 89   Resp 16   Ht 5' 4" (1.626 m)   Wt 85.8 kg (189 lb 2.5 oz)   BMI 32.47 kg/m²         Physical Exam  Constitutional:       General: She is not in acute distress.     Appearance: Normal appearance. She is not ill-appearing.   HENT:      Head: Normocephalic and atraumatic.      Nose: No congestion or rhinorrhea.   Eyes:      Extraocular Movements: Extraocular movements " intact.      Pupils: Pupils are equal, round, and reactive to light.   Pulmonary:      Effort: Pulmonary effort is normal.   Abdominal:      General: Abdomen is flat.      Palpations: Abdomen is soft.   Skin:     General: Skin is warm and dry.      Capillary Refill: Capillary refill takes less than 2 seconds.   Neurological:      General: No focal deficit present.      Mental Status: She is alert and oriented to person, place, and time.      Sensory: No sensory deficit.      Motor: Weakness present. No abnormal muscle tone.      Gait: Gait abnormal.      Deep Tendon Reflexes:      Reflex Scores:       Patellar reflexes are 2+ on the right side and 1+ on the left side.       Achilles reflexes are 2+ on the right side and 2+ on the left side.     Comments: 4/5 strength in left hip extension and left knee extension   Psychiatric:         Mood and Affect: Mood normal.         Behavior: Behavior normal.         Thought Content: Thought content normal.           Musculoskeletal:    \    Lumbar Exam  Incision: no  Pain with Flexion: yes  Pain with Extension: yes, extension worse than flexion  ROM:  Decreased  Paraspinous TTP:  Positive on left  Facet TTP:  L5-S1  Facet Loading:  Positive on the left  SLR:  Positive on the left at 75°  SIJ TTP:  Positive on left  MITALI:  Positive on the left      LABS:  Lab Results   Component Value Date    WBC 6.02 08/30/2024    HGB 11.5 (L) 08/30/2024    HCT 39.5 08/30/2024    MCV 84 08/30/2024     08/30/2024       CMP  Sodium   Date Value Ref Range Status   08/30/2024 142 136 - 145 mmol/L Final     Potassium   Date Value Ref Range Status   08/30/2024 3.9 3.5 - 5.1 mmol/L Final     Chloride   Date Value Ref Range Status   08/30/2024 106 95 - 110 mmol/L Final     CO2   Date Value Ref Range Status   08/30/2024 25 23 - 29 mmol/L Final     Glucose   Date Value Ref Range Status   08/30/2024 92 70 - 110 mg/dL Final     BUN   Date Value Ref Range Status   08/30/2024 19 8 - 23 mg/dL Final      Creatinine   Date Value Ref Range Status   08/30/2024 0.9 0.5 - 1.4 mg/dL Final     Calcium   Date Value Ref Range Status   08/30/2024 9.6 8.7 - 10.5 mg/dL Final     Total Protein   Date Value Ref Range Status   08/30/2024 7.0 6.0 - 8.4 g/dL Final     Albumin   Date Value Ref Range Status   08/30/2024 3.4 (L) 3.5 - 5.2 g/dL Final     Total Bilirubin   Date Value Ref Range Status   08/30/2024 0.4 0.1 - 1.0 mg/dL Final     Comment:     For infants and newborns, interpretation of results should be based  on gestational age, weight and in agreement with clinical  observations.    Premature Infant recommended reference ranges:  Up to 24 hours.............<8.0 mg/dL  Up to 48 hours............<12.0 mg/dL  3-5 days..................<15.0 mg/dL  6-29 days.................<15.0 mg/dL       Alkaline Phosphatase   Date Value Ref Range Status   08/30/2024 140 (H) 55 - 135 U/L Final     AST   Date Value Ref Range Status   08/30/2024 45 (H) 10 - 40 U/L Final     ALT   Date Value Ref Range Status   08/30/2024 27 10 - 44 U/L Final     Anion Gap   Date Value Ref Range Status   08/30/2024 11 8 - 16 mmol/L Final       Lab Results   Component Value Date    HGBA1C 6.2 (H) 08/30/2024             ASSESSMENT:       68 y.o. year old female with lower back pain, consistent with     1. Lumbar radiculopathy  X-Ray Lumbar Complete Including Flex And Ext    MRI Lumbar Spine Without Contrast    Ambulatory referral/consult to Physical/Occupational Therapy    pregabalin (LYRICA) 50 MG capsule      2. Left sided sciatica  Ambulatory referral/consult to Pain Clinic    pregabalin (LYRICA) 50 MG capsule      3. Sacroiliitis  pregabalin (LYRICA) 50 MG capsule      4. Lumbar spondylosis  pregabalin (LYRICA) 50 MG capsule      5. DDD (degenerative disc disease), lumbar  pregabalin (LYRICA) 50 MG capsule      6. Dorsalgia, unspecified  pregabalin (LYRICA) 50 MG capsule      7. Lumbar radiculopathy, chronic  pregabalin (LYRICA) 50 MG capsule         Lumbar radiculopathy  -     X-Ray Lumbar Complete Including Flex And Ext; Future; Expected date: 09/18/2024  -     MRI Lumbar Spine Without Contrast; Future; Expected date: 09/18/2024  -     Ambulatory referral/consult to Physical/Occupational Therapy; Future; Expected date: 09/25/2024  -     pregabalin (LYRICA) 50 MG capsule; Take 1 capsule (50 mg total) by mouth 2 (two) times daily.  Dispense: 60 capsule; Refill: 1    Left sided sciatica  -     Ambulatory referral/consult to Pain Clinic  -     pregabalin (LYRICA) 50 MG capsule; Take 1 capsule (50 mg total) by mouth 2 (two) times daily.  Dispense: 60 capsule; Refill: 1    Sacroiliitis  -     pregabalin (LYRICA) 50 MG capsule; Take 1 capsule (50 mg total) by mouth 2 (two) times daily.  Dispense: 60 capsule; Refill: 1    Lumbar spondylosis  -     pregabalin (LYRICA) 50 MG capsule; Take 1 capsule (50 mg total) by mouth 2 (two) times daily.  Dispense: 60 capsule; Refill: 1    DDD (degenerative disc disease), lumbar  -     pregabalin (LYRICA) 50 MG capsule; Take 1 capsule (50 mg total) by mouth 2 (two) times daily.  Dispense: 60 capsule; Refill: 1    Dorsalgia, unspecified  -     pregabalin (LYRICA) 50 MG capsule; Take 1 capsule (50 mg total) by mouth 2 (two) times daily.  Dispense: 60 capsule; Refill: 1    Lumbar radiculopathy, chronic  -     pregabalin (LYRICA) 50 MG capsule; Take 1 capsule (50 mg total) by mouth 2 (two) times daily.  Dispense: 60 capsule; Refill: 1             PLAN:   - Interventions:   None at this time.  Pain appears to be consistent with left lumbar radiculopathy versus overlapping left sacroiliac joint dysfunction.  We will obtain updated imaging.    - Anticoagulation use:   No no anticoagulation    - Medications:  - discontinue gabapentin and start pregabalin 50 mg twice a day  - continue Mobic 15 mg daily p.r.n.    - Therapy:   - patient has completed 6 weeks of physician lead therapy with no relief.  - refer patient to formal physical  therapy for lower back pain    - Imaging/Diagnostic:  - we will obtain updated x-rays of lumbar spine as well as MRI lumbar spine without contrast to further evaluate lower back pain with left lumbar radiculopathy that has continued despite over 8 weeks of conservative therapy    - Consults:   None at this time      - Patient Questions: Answered all of the patient's questions regarding diagnosis, therapy, and treatment     This condition does not require this patient to take time off of work, and the primary goal of our Pain Management services is to improve the patient's functional capacity.     - Follow up visit: return to clinic  after MRI        The above plan and management options were discussed at length with patient. Patient is in agreement with the above and verbalized understanding.    I discussed the goals of interventional chronic pain management with the patient on today's visit.  I explained the utility of injections for diagnostic and therapeutic purposes.  We discussed a multimodal approach to pain including treating the patient's given worst pain at any given time.  We will use a systematic approach to addressing pain.  We will also adopt a multimodal approach that includes injections, adjuvant medications, physical therapy, at times psychiatry.  There may be a limited role for opioid use intermittently in the treatment of pain, more particularly for acute pain although no one approach can be used as a sole treatment modality.    I emphasized the importance of regular exercise, core strengthening and stretching, diet and weight loss as a cornerstone of long-term pain management.      Rony Rodríguez MD  Interventional Pain Management  [facility]    Future Appointments   Date Time Provider Department Center   9/20/2024  7:00 PM [facility] MRI1 [facility]   9/25/2024  8:00 AM Emelyn Basilio PA-C ON [facility]   3/3/2025  8:20 AM RAVEN RIZO [facility] LAB [place]    3/10/2025  9:20 AM Buddy Ortiz MD [facility]           Disclaimer:  This note was prepared using voice recognition system and is likely to have sound alike errors that may have been overlooked even after proof reading.  Please call me with any questions    I spent a total of 45 minutes on the day of the visit.  This includes face to face time and non-face to face time preparing to see the patient (eg, review of tests), obtaining and/or reviewing separately obtained history, documenting clinical information in the electronic or other health record, independently interpreting results and communicating results to the patient/family/caregiver, or care coordinator.

## 2024-09-20 ENCOUNTER — HOSPITAL ENCOUNTER (OUTPATIENT)
Dept: RADIOLOGY | Facility: HOSPITAL | Age: 69
Discharge: HOME | End: 2024-09-20
Attending: ANESTHESIOLOGY
Payer: MEDICARE

## 2024-09-20 DIAGNOSIS — M54.16 LUMBAR RADICULOPATHY: ICD-10-CM

## 2024-09-20 PROCEDURE — 72148 MRI LUMBAR SPINE W/O DYE: CPT | Mod: TC

## 2024-09-20 PROCEDURE — 72148 MRI LUMBAR SPINE W/O DYE: CPT | Mod: 26,,, | Performed by: RADIOLOGY

## 2024-09-23 ENCOUNTER — CLINICAL SUPPORT (OUTPATIENT)
Dept: REHABILITATION | Facility: HOSPITAL | Age: 69
End: 2024-09-23
Attending: ANESTHESIOLOGY
Payer: MEDICARE

## 2024-09-23 DIAGNOSIS — R29.898 DECREASED ROM OF NECK: ICD-10-CM

## 2024-09-23 DIAGNOSIS — M53.86 DECREASED ROM OF LUMBAR SPINE: Primary | ICD-10-CM

## 2024-09-23 DIAGNOSIS — M54.16 LUMBAR RADICULOPATHY: ICD-10-CM

## 2024-09-23 PROCEDURE — 97530 THERAPEUTIC ACTIVITIES: CPT | Mod: PN

## 2024-09-23 PROCEDURE — 97161 PT EVAL LOW COMPLEX 20 MIN: CPT | Mod: PN

## 2024-09-23 PROCEDURE — 97112 NEUROMUSCULAR REEDUCATION: CPT | Mod: PN

## 2024-09-23 NOTE — PROGRESS NOTES
[facility]  Physical Therapy Initial Evaluation    Name: Pamela Lancaster  Clinic Number: [id number]    Therapy Diagnosis:   Encounter Diagnoses   Name Primary?    Lumbar radiculopathy     Decreased ROM of lumbar spine Yes    Decreased ROM of neck      Physician: Rony Rodríguez MD    Physician Orders: PT Eval and Treat   Medical Diagnosis from Referral: M54.16 (ICD-10-CM) - Lumbar radiculopathy   Evaluation Date: 9/23/2024  Authorization Period Expiration: 12/31/24  Plan of Care Expiration: 12/2/24  Visit # / Visits authorized: 1/ 1  FOTO: 1/3  Progress note due 30 days from 9/23/24    Precautions: Standard, gastroc bypass surgery    Time In: 8:03 am  Time Out: 9:00 am  Total Time: 57 minutes      Subjective   Date of onset: about 3 weeks ago  History of current condition - Pamela reports: for no known cause she began having severe pain trying to bend over to put on her shoes. Pt states she had similar pain 20 years ago but this time it seemed severe.     Pain:  Current 6/10, worst 8/10, best 4/10   Location: low back and L Lateral thigh  Description: sore, cramps  Aggravating Factors: sitting upright, getting out of the chair, standing  Easing Factors: leaning to R when seated, medication     Prior Therapy: yes  Social History:  lives with their spouse  Occupation: retired  Prior Level of Function: home activities  Current Level of Function: limited in standing, sitting,     Imaging: Lumbar vertebral body height are maintained. Grade 1 spondylolisthesis at L4/5 without evidence of spondylolysis. Disc space narrowing and spondylosis throughout the lumbar spine. Moderate facet arthropathy greatest at L5/S1 with suspected neuroforaminal narrowing. No evidence of acute fracture.     Medical History:   No past medical history on file.    Surgical History:   Pamela Lancaster  has a past surgical history that includes Joint replacement and Colonoscopy (N/A, 3/27/2024).    Medications:    Pamela has a current medication list which includes the following prescription(s): atorvastatin, cetirizine, cholecalciferol (vitamin d3), cyanocobalamin, diclofenac sodium, epinephrine, hydrochlorothiazide, meloxicam, and pregabalin.    Allergies:   Review of patient's allergies indicates:   Allergen Reactions    Shellfish containing products Rash and Swelling     Other Reaction(s): Not available    Iodine      Other Reaction(s): Not available    Tuberculin         Pts goals: less pain so she can be more active    Objective       CMS Impairment/Limitation/Restriction for FOTO low Survey    Therapist reviewed FOTO scores for Pamela Lancaster on 9/23/2024.   FOTO documents entered into EPIC - see Media section.    Functional Score: 65       Posture/Structure: Pt presents with forward head posture, rounded shoulder's, reduced cervical lordosis, reduced thoracic kyphosis, and accentuated lumbar lordosis.    Gait: L hip drop in R single leg stand, anterior lean narrow JACK B  Single leg stand on the R demonstrated hip drop on the L    Sensation:    Intact to light touch    Balance: Single leg stand R=3 sec, L=3 sec;  30 sec sit to stand test: 7 reps; squat LOB anterior and limited ROM      Lumbar spine AROM:     Degrees Pain Present (Y/N)   Flexion Able to reach fully to floor but poor reversal of lumbar curve (most is from hips) N but with reps says it is sore   R side bend to knee n   L To  knee n   Extend 20 degree Y     Hip AROM:     Degrees (R/L) Pain Present (Y/N)   Hip flex 120 n   Hip Abd 30 n   Hip ER (sitting) 40/40 Y knees   Hip IR (sitting) 30 n   Hip Ext (prone) 0 0     Upper cervical flexion and rotation 50 % each at best    Prone knee flexion only to 90 degrees B (tight quads and hip flexors; trigger points noted)    Strength:    R L   Hip flexors L2   4/5 4/5  Quadriceps L234  5/5 5/5    Hamstrings S1  4/5 4/5   Plantar flexion S1  2/5 2/5    Dorsiflexion L4  5/5 5/5   Gluteus Medius  L45S1 3+/5 3+/5   Gluteus Hever  3/5 3/5   Core 2/5  with rectus and transverse abdominus     Special Test:  Slump Test:  -  Distraction:  +    SLR Test:  -  Quadrant:  + extension    Ernst:  +           Chel repeated standing flexion - but slow and guarded behavior; repeated supine flexion -  Repeated standing extension +; repeated prone extension +        Palpation: tight lumbar paraspinals    Neural Tension Test: none in sciatic    TREATMENT   Treatment Time In: 8:22 am  Treatment Time Out: 9:10 am  Total Treatment time separate from Evaluation: 48 minutes    Pamela participated in neuromuscular re-education activities to improve: Balance, Kinesthetic, Proprioception, and Posture for 38 minutes. The following activities were included:    Side lying hip flexion/extension swings 1x 8 (max cues to reduce lordosis and hip adduction)  Posterior pelvic tilt 1x 10 (10 sec hold on last 3)  PPT with ball squeeze 10 x 5 sec hold  Prone hamstring curls 5# with PPT (cues to keep hip in neutral)  Seated LAQ 5# 2x 10  Assisted sit up knees bent  Long seated core engagement  x10 (transverse abdominus engagement) as if reaching over a ball  Leaning hip extension 2x 10 with core engaged  Cervical retract with chin tuck  1x 10  Supine scapular protraction 1x 10  Add SLR , open book, and more core work on follow up (knees sensitive to pressure so no QPED)      Pamela participated in dynamic functional therapeutic activities to improve functional performance for 10  minutes, including:    Training on breathing on exhale to reduce back guarding with transfers and during therex  Cues on core engagement to reduce tendency to fall into lordosis  Seated PPT to reduce lordosis      Home Exercises and Patient Education Provided    Education provided:   -Education on condition, HEP, and PT educated pt on breathing tasks, need for core support and increased ROM in quads and hip flexors to reduce guarding in spine.    Written Home  Exercises Provided: Patient instructed to cont prior HEP.  Exercises were reviewed and Pamela was able to demonstrate them prior to the end of the session.  Pamela demonstrated good  understanding of the education provided.     See EMR under Patient Instructions for exercises provided 9/23/2024.    Assessment   Pamela is a 68 y.o. female referred to outpatient Physical Therapy with a medical diagnosis of M54.16 (ICD-10-CM) - Lumbar radiculopathy . The patient presents with signs and symptoms consistent with diagnosis along with back pain, decreased functional mobility and endurance and with impairments which include impairments list: ROM, strength, endurance, joint mobility, muscle length, balance, posture, gait mechanics, core strength and stability, and functional movement patterns.  These impairments are limiting patient's ability to walk, stand and tolerate sitting.     Pt prognosis is Good.   Pt will benefit from skilled outpatient Physical Therapy to address the deficits stated above and in the chart below, provide pt/family education, and to maximize pt's level of independence.     Plan of care discussed with patient: Yes  Pt's spiritual, cultural and educational needs considered and patient is agreeable to the plan of care and goals as stated below:     Anticipated Barriers for therapy: multiple joints involved (hx PT for shoulders, knees and back)      Medical Necessity is demonstrated by the following  History  Co-morbidities and personal factors that may impact the plan of care [x] LOW: no personal factors / co-morbidities  [] MODERATE: 1-2 personal factors / co-morbidities  [] HIGH: 3+ personal factors / co-morbidities    Moderate / High Support Documentation:   Co-morbidities affecting plan of care: na    Personal Factors:   no deficits     Examination  Body Structures and Functions, activity limitations and participation restrictions that may impact the plan of care [x] LOW: addressing 1-2 elements  []  MODERATE: 3+ elements  [] HIGH: 4+ elements (please support below)    Moderate / High Support Documentation: na     Clinical Presentation [x] LOW: stable  [] MODERATE: Evolving  [] HIGH: Unstable     Decision Making/ Complexity Score: low         Goals:  Short Term Goals: In 4 weeks   1.I with HEP  2.Pt to increase lumbar ROM to fair reversal of lumbar curve    3.Pt to increase core strength to 2+/5.  4.Pt to increase hip abduction to 4/5  5.Pt to have pain less than 3/10 at all times.  6.Pt to improve score on the FOTO by 10%.  7. Pt to be educated on postural/body mechanics awareness.    Long Term Goals: In 10 weeks 12/2/24  1.Patient to improve score on the FOTO to 75.  2. Patient to demo increase in LE strength to 5/5 with hip abduction  3. Patient to have decreased pain to 2/10  at worst.  4. Patient to demo increase sit up to 3/5 strength  5. Patient to perform daily activities including dead lifting 50 # or better and floor to shoulder to 5# or better.      Plan   Plan of care Certification: 9/23/2024 to 12/2/24.    Outpatient Physical Therapy 2 times weekly for 10 weeks to include the following interventions: Cervical/Lumbar Traction, Electrical Stimulation IFC, NMES, Gait Training, Manual Therapy, Moist Heat/ Ice, Neuromuscular Re-ed, Patient Education, Self Care, Therapeutic Activities, Therapeutic Exercise, and DN .     Nellie Porter, PT, CIDN, SFMA, CEAS-1    Thank you for this referral.    These services are reasonable and necessary for the conditions set forth above while under my care.

## 2024-09-23 NOTE — PLAN OF CARE
[facility]  Physical Therapy Initial Evaluation    Name: Pamela Lancaster  Clinic Number: [id number]    Therapy Diagnosis:   Encounter Diagnoses   Name Primary?    Lumbar radiculopathy     Decreased ROM of lumbar spine Yes    Decreased ROM of neck      Physician: Rony Rodríguez MD    Physician Orders: PT Eval and Treat   Medical Diagnosis from Referral: M54.16 (ICD-10-CM) - Lumbar radiculopathy   Evaluation Date: 9/23/2024  Authorization Period Expiration: 12/31/24  Plan of Care Expiration: 12/2/24  Visit # / Visits authorized: 1/ 1  FOTO: 1/3  Progress note due 30 days from 9/23/24    Precautions: Standard, gastroc bypass surgery    Time In: 8:03 am  Time Out: 9:00 am  Total Time: 57 minutes      Subjective   Date of onset: about 3 weeks ago  History of current condition - Pamela reports: for no known cause she began having severe pain trying to bend over to put on her shoes. Pt states she had similar pain 20 years ago but this time it seemed severe.     Pain:  Current 6/10, worst 8/10, best 4/10   Location: low back and L Lateral thigh  Description: sore, cramps  Aggravating Factors: sitting upright, getting out of the chair, standing  Easing Factors: leaning to R when seated, medication     Prior Therapy: yes  Social History:  lives with their spouse  Occupation: retired  Prior Level of Function: home activities  Current Level of Function: limited in standing, sitting,     Imaging: Lumbar vertebral body height are maintained. Grade 1 spondylolisthesis at L4/5 without evidence of spondylolysis. Disc space narrowing and spondylosis throughout the lumbar spine. Moderate facet arthropathy greatest at L5/S1 with suspected neuroforaminal narrowing. No evidence of acute fracture.     Medical History:   No past medical history on file.    Surgical History:   Pamela Lancaster  has a past surgical history that includes Joint replacement and Colonoscopy (N/A, 3/27/2024).    Medications:    Pamela has a current medication list which includes the following prescription(s): atorvastatin, cetirizine, cholecalciferol (vitamin d3), cyanocobalamin, diclofenac sodium, epinephrine, hydrochlorothiazide, meloxicam, and pregabalin.    Allergies:   Review of patient's allergies indicates:   Allergen Reactions    Shellfish containing products Rash and Swelling     Other Reaction(s): Not available    Iodine      Other Reaction(s): Not available    Tuberculin         Pts goals: less pain so she can be more active    Objective       CMS Impairment/Limitation/Restriction for FOTO low Survey    Therapist reviewed FOTO scores for Pamela Lancaster on 9/23/2024.   FOTO documents entered into EPIC - see Media section.    Functional Score: 65       Posture/Structure: Pt presents with forward head posture, rounded shoulder's, reduced cervical lordosis, reduced thoracic kyphosis, and accentuated lumbar lordosis.    Gait: L hip drop in R single leg stand, anterior lean narrow JACK B  Single leg stand on the R demonstrated hip drop on the L    Sensation:    Intact to light touch    Balance: Single leg stand R=3 sec, L=3 sec;  30 sec sit to stand test: 7 reps; squat LOB anterior and limited ROM      Lumbar spine AROM:     Degrees Pain Present (Y/N)   Flexion Able to reach fully to floor but poor reversal of lumbar curve (most is from hips) N but with reps says it is sore   R side bend to knee n   L To  knee n   Extend 20 degree Y     Hip AROM:     Degrees (R/L) Pain Present (Y/N)   Hip flex 120 n   Hip Abd 30 n   Hip ER (sitting) 40/40 Y knees   Hip IR (sitting) 30 n   Hip Ext (prone) 0 0     Upper cervical flexion and rotation 50 % each at best    Prone knee flexion only to 90 degrees B (tight quads and hip flexors; trigger points noted)    Strength:    R L   Hip flexors L2   4/5 4/5  Quadriceps L234  5/5 5/5    Hamstrings S1  4/5 4/5   Plantar flexion S1  2/5 2/5    Dorsiflexion L4  5/5 5/5   Gluteus Medius  L45S1 3+/5 3+/5   Gluteus Hever  3/5 3/5   Core 2/5  with rectus and transverse abdominus     Special Test:  Slump Test:  -  Distraction:  +    SLR Test:  -  Quadrant:  + extension    Ernst:  +           Chel repeated standing flexion - but slow and guarded behavior; repeated supine flexion -  Repeated standing extension +; repeated prone extension +        Palpation: tight lumbar paraspinals    Neural Tension Test: none in sciatic    TREATMENT   Treatment Time In: 8:22 am  Treatment Time Out: 9:10 am  Total Treatment time separate from Evaluation: 48 minutes    Pamela participated in neuromuscular re-education activities to improve: Balance, Kinesthetic, Proprioception, and Posture for 38 minutes. The following activities were included:    Side lying hip flexion/extension swings 1x 8 (max cues to reduce lordosis and hip adduction)  Posterior pelvic tilt 1x 10 (10 sec hold on last 3)  PPT with ball squeeze 10 x 5 sec hold  Prone hamstring curls 5# with PPT (cues to keep hip in neutral)  Seated LAQ 5# 2x 10  Assisted sit up knees bent  Long seated core engagement  x10 (transverse abdominus engagement) as if reaching over a ball  Leaning hip extension 2x 10 with core engaged  Cervical retract with chin tuck  1x 10  Supine scapular protraction 1x 10  Add SLR , open book, and more core work on follow up (knees sensitive to pressure so no QPED)      Pamela participated in dynamic functional therapeutic activities to improve functional performance for 10  minutes, including:    Training on breathing on exhale to reduce back guarding with transfers and during therex  Cues on core engagement to reduce tendency to fall into lordosis  Seated PPT to reduce lordosis      Home Exercises and Patient Education Provided    Education provided:   -Education on condition, HEP, and PT educated pt on breathing tasks, need for core support and increased ROM in quads and hip flexors to reduce guarding in spine.    Written Home  Exercises Provided: Patient instructed to cont prior HEP.  Exercises were reviewed and Pamela was able to demonstrate them prior to the end of the session.  Pamela demonstrated good  understanding of the education provided.     See EMR under Patient Instructions for exercises provided 9/23/2024.    Assessment   aPmela is a 68 y.o. female referred to outpatient Physical Therapy with a medical diagnosis of M54.16 (ICD-10-CM) - Lumbar radiculopathy . The patient presents with signs and symptoms consistent with diagnosis along with back pain, decreased functional mobility and endurance and with impairments which include impairments list: ROM, strength, endurance, joint mobility, muscle length, balance, posture, gait mechanics, core strength and stability, and functional movement patterns.  These impairments are limiting patient's ability to walk, stand and tolerate sitting.     Pt prognosis is Good.   Pt will benefit from skilled outpatient Physical Therapy to address the deficits stated above and in the chart below, provide pt/family education, and to maximize pt's level of independence.     Plan of care discussed with patient: Yes  Pt's spiritual, cultural and educational needs considered and patient is agreeable to the plan of care and goals as stated below:     Anticipated Barriers for therapy: multiple joints involved (hx PT for shoulders, knees and back)      Medical Necessity is demonstrated by the following  History  Co-morbidities and personal factors that may impact the plan of care [x] LOW: no personal factors / co-morbidities  [] MODERATE: 1-2 personal factors / co-morbidities  [] HIGH: 3+ personal factors / co-morbidities    Moderate / High Support Documentation:   Co-morbidities affecting plan of care: na    Personal Factors:   no deficits     Examination  Body Structures and Functions, activity limitations and participation restrictions that may impact the plan of care [x] LOW: addressing 1-2 elements  []  MODERATE: 3+ elements  [] HIGH: 4+ elements (please support below)    Moderate / High Support Documentation: na     Clinical Presentation [x] LOW: stable  [] MODERATE: Evolving  [] HIGH: Unstable     Decision Making/ Complexity Score: low         Goals:  Short Term Goals: In 4 weeks   1.I with HEP  2.Pt to increase lumbar ROM to fair reversal of lumbar curve    3.Pt to increase core strength to 2+/5.  4.Pt to increase hip abduction to 4/5  5.Pt to have pain less than 3/10 at all times.  6.Pt to improve score on the FOTO by 10%.  7. Pt to be educated on postural/body mechanics awareness.    Long Term Goals: In 10 weeks 12/2/24  1.Patient to improve score on the FOTO to 75.  2. Patient to demo increase in LE strength to 5/5 with hip abduction  3. Patient to have decreased pain to 2/10  at worst.  4. Patient to demo increase sit up to 3/5 strength  5. Patient to perform daily activities including dead lifting 50 # or better and floor to shoulder to 5# or better.      Plan   Plan of care Certification: 9/23/2024 to 12/2/24.    Outpatient Physical Therapy 2 times weekly for 10 weeks to include the following interventions: Cervical/Lumbar Traction, Electrical Stimulation IFC, NMES, Gait Training, Manual Therapy, Moist Heat/ Ice, Neuromuscular Re-ed, Patient Education, Self Care, Therapeutic Activities, Therapeutic Exercise, and DN.     Nellie Porter, PT, CIDN, SFMA, CEAS-1    Thank you for this referral.    These services are reasonable and necessary for the conditions set forth above while under my care.

## 2024-09-24 NOTE — PROGRESS NOTES
Established Patient Interventional Pain Note (Follow up Visit)    Referring Physician: No ref. provider found    PCP: Buddy Ortiz MD    Chief Complaint:  MRI review  Left SI Joint pain       SUBJECTIVE:    Interval History (9/25/2024):   Pamela Lancaster presents today for follow-up visit.  Patient was last seen on 9/18/2024. At that visit, the plan was to complete a MRI of lumbar spine. Patient reports pain as 9/10 today.  She reports persistent pain in left buttock, posterior hip and SI joint. Patient states she woke up in pain about a month ago. Denies any recent falls or other trauma.   She does have intermittent leg pain that will radiate down her thigh but it usually stops above the knee.       Initial HPI (9/18/2024):  Pamela Lancaster is a 68 y.o. female who presents to the clinic for the evaluation of lower back pain.   Patient reports 2 month history lower back pain.  Patient denies any previous surgeries on her lumbar spine.  Patient reports having previous bilateral TKA.  Patient denies any significant inciting traumas to her lower back pain.    Pain is described as a aching stabbing pain that starts in the left lower back and left buttocks area.  This pain then radiates to the left groin and along the anterior aspect and lateral aspect to the middle of the left thigh.  Patient denies any significant right lower extremity pain.  Pain is worse with extension prolonged walking, better with sitting down and rest.  Pain is currently rated an 8/10.  Patient denies any fevers, chills, saddle anesthesia, or bowel and bladder incontinence.      Non-Pharmacologic Treatments:  Physical Therapy/Home Exercise: yes  Ice/Heat:yes  TENS: no  Acupuncture: no  Massage: yes  Chiropractic: no        Previous Pain Medications:  Tylenol, ibuprofen, meloxicam, gabapentin, tizanidine, Flexeril, topicals    Pain Procedures:   none    Pain Disability Index Review:         9/25/2024     7:47 AM 9/18/2024    12:10  PM   Last 3 PDI Scores   Pain Disability Index (PDI) 54 63     Imaging/ Diagnostic Studies/ Labs (Reviewed on 9/25/2024):  Results for orders placed during the hospital encounter of 09/20/24    MRI Lumbar Spine Without Contrast    Narrative  EXAMINATION:  MRI LUMBAR SPINE WITHOUT CONTRAST    CLINICAL HISTORY:  Radiculopathy, lumbar regionLow back pain, symptoms persist with > 6wks conservative treatment;Lumbar radiculopathy, symptoms persist with conservative treatment;    TECHNIQUE:  Standard multiplanar noncontrast MRI sequences of the lumbar spine.    COMPARISON:  Plain x-ray 09/18/2024.    FINDINGS:  The distal cord and conus reveal normal signal and morphology.    There is grade 1 L4-5 spondylolisthesis.    There are type 2 endplate signal changes at L5-S1    There is subtle increased T 2 signal in the S2 sacral segment and extending into the left sacral ala.  Possible sacral insufficiency fracture.  Consider correlation with limited bone scan or MRI of the pelvis.    T12-L1: Minor disc desiccation.    L1-2:     Right renal cyst noted.    L2-3:     Minor disc desiccation and disc bulge with minor facet arthrosis.    L3-4:     Mild disc desiccation and disc bulge with mild facet arthrosis.    L4-5:     Mild disc degeneration with disc narrowing and desiccation.  Moderate facet arthrosis with grade 1 spondylolisthesis.  Mild central and foraminal stenosis.    L5-S1:    Moderate degenerative disc disease with mild facet arthrosis.  Mild right and mild-to-moderate left foraminal stenosis.    Impression  Abnormal marrow signal within the S2 sacral segment and in the left sacral ala concerning for insufficiency type pelvic fracture.  Consider confirmation with limited bone scan of the pelvis or MRI of the pelvis.  Degenerative disc and facet disease as above.      Electronically signed by: Donald Hall MD  Date:    09/23/2024  Time:    08:22        Results for orders placed during the hospital encounter of  09/18/24    X-Ray Lumbar Complete Including Flex And Ext    Narrative  EXAM:  XR LUMBAR SPINE 5 VIEW WITH FLEX AND EXT    CLINICAL HISTORY: Back pain.    FINDINGS:    Lumbar vertebral body height are maintained.  Grade 1 spondylolisthesis at L4/5 without evidence of spondylolysis.  Disc space narrowing and spondylosis throughout the lumbar spine.  Moderate facet arthropathy greatest at L5/S1 with suspected neuroforaminal narrowing.  No evidence of acute fracture.    Impression  As above    Finalized on: 9/18/2024 1:20 PM By:  Segundo Cheng MD  [facility]# [id number]      2024-09-18 13:22:[id number]    [facility]      No past medical history on file.  Past Surgical History:   Procedure Laterality Date    COLONOSCOPY N/A 3/27/2024    Procedure: COLONOSCOPY;  Surgeon: Tay Mercado MD;  Location: [facility];  Service: General;  Laterality: N/A;    JOINT REPLACEMENT       Social History     Socioeconomic History    Marital status:    Tobacco Use    Smoking status: Never    Smokeless tobacco: Never   Substance and Sexual Activity    Alcohol use: Never    Drug use: Never    Sexual activity: Not Currently     Social Determinants of Health     Financial Resource Strain: Low Risk  (4/28/2024)    Overall Financial Resource Strain (CARDIA)     Difficulty of Paying Living Expenses: Not hard at all   Food Insecurity: No Food Insecurity (4/28/2024)    Hunger Vital Sign     Worried About Running Out of Food in the Last Year: Never true     Ran Out of Food in the Last Year: Never true   Transportation Needs: No Transportation Needs (4/28/2024)    PRAPARE - Transportation     Lack of Transportation (Medical): No     Lack of Transportation (Non-Medical): No   Physical Activity: Sufficiently Active (4/28/2024)    Exercise Vital Sign     Days of Exercise per Week: 6 days     Minutes of Exercise per Session: 30 min   Stress: No Stress Concern Present (4/28/2024)    [employer] - Occupational Stress Questionnaire      Feeling of Stress : Not at all   Housing Stability: Unknown (4/28/2024)    Housing Stability Vital Sign     Unable to Pay for Housing in the Last Year: No     Family History   Problem Relation Name Age of Onset    No Known Problems Mother      No Known Problems Father         Review of patient's allergies indicates:   Allergen Reactions    Shellfish containing products Rash and Swelling     Other Reaction(s): Not available    Iodine      Other Reaction(s): Not available    Tuberculin        Current Outpatient Medications   Medication Sig    atorvastatin (LIPITOR) 40 MG tablet Take 1 tablet (40 mg total) by mouth once daily.    cetirizine (ZYRTEC) 10 MG tablet Take 1 tablet (10 mg total) by mouth once daily.    cholecalciferol, vitamin D3, (VITAMIN D3) 50 mcg (2,000 unit) Tab Take 2,000 Units by mouth once daily.    cyanocobalamin (VITAMIN B-12) 1000 MCG tablet Take 1 tablet by mouth once daily.    diclofenac sodium (SOLARAZE) 3 % gel Apply up to 4 times a day.    EPINEPHrine (EPIPEN JR) 0.15 mg/0.3 mL pen injection Inject 0.3 mLs (0.15 mg total) into the muscle as needed for Anaphylaxis.    hydroCHLOROthiazide (HYDRODIURIL) 50 MG tablet Take 0.5 tablets (25 mg total) by mouth once daily.    meloxicam (MOBIC) 15 MG tablet Take 1 tablet (15 mg total) by mouth once daily.    pregabalin (LYRICA) 50 MG capsule Take 1 capsule (50 mg total) by mouth 2 (two) times daily.     No current facility-administered medications for this visit.         ROS  Review of Systems   Constitutional:  Negative for chills, diaphoresis, fatigue and fever.   HENT:  Negative for ear discharge, ear pain, rhinorrhea, trouble swallowing and voice change.    Eyes:  Negative for pain and redness.   Respiratory:  Negative for chest tightness, shortness of breath, wheezing and stridor.    Cardiovascular:  Negative for chest pain and leg swelling.   Gastrointestinal:  Negative for blood in stool, diarrhea, nausea and vomiting.   Endocrine: Negative  "for cold intolerance and heat intolerance.   Genitourinary:  Negative for dysuria, hematuria and urgency.   Musculoskeletal:  Positive for arthralgias, back pain, gait problem and myalgias. Negative for joint swelling, neck pain and neck stiffness.   Skin:  Negative for rash.   Neurological:  Negative for tremors, seizures, speech difficulty, light-headedness and headaches.   Hematological:  Does not bruise/bleed easily.   Psychiatric/Behavioral:  Negative for agitation, confusion and suicidal ideas. The patient is nervous/anxious.       OBJECTIVE:  /77 (BP Location: Right arm, Patient Position: Sitting, BP Method: Medium (Automatic))   Pulse 62   Resp 17   Ht 5' 4" (1.626 m)   Wt 86.1 kg (189 lb 14.8 oz)   BMI 32.60 kg/m²     Physical Exam  Constitutional:       General: She is not in acute distress.     Appearance: Normal appearance. She is not ill-appearing.   HENT:      Head: Normocephalic and atraumatic.      Nose: No congestion or rhinorrhea.   Eyes:      Extraocular Movements: Extraocular movements intact.      Pupils: Pupils are equal, round, and reactive to light.   Pulmonary:      Effort: Pulmonary effort is normal.   Abdominal:      General: Abdomen is flat.      Palpations: Abdomen is soft.   Skin:     General: Skin is warm and dry.      Capillary Refill: Capillary refill takes less than 2 seconds.   Neurological:      General: No focal deficit present.      Mental Status: She is alert and oriented to person, place, and time.      Sensory: No sensory deficit.      Motor: Weakness present. No abnormal muscle tone.      Gait: Gait abnormal.      Deep Tendon Reflexes:      Reflex Scores:       Patellar reflexes are 2+ on the right side and 1+ on the left side.       Achilles reflexes are 2+ on the right side and 2+ on the left side.     Comments: 4/5 strength in left hip extension and left knee extension   Psychiatric:         Mood and Affect: Mood normal.         Behavior: Behavior normal.       "   Thought Content: Thought content normal.       Musculoskeletal:  Lumbar Exam  Incision: no  Pain with Flexion: Yes  Pain with Extension: yes, extension worse than flexion  ROM:  Decreased  Paraspinous TTP:  Positive on left lumbosacral area  Facet TTP:  L5-S1  Facet Loading:  Positive on the left  SLR:  Absent  SIJ TTP:  Positive on left  MITALI:  Positive on the left  Pain with hip abduction    LABS:  Lab Results   Component Value Date    WBC 6.02 08/30/2024    HGB 11.5 (L) 08/30/2024    HCT 39.5 08/30/2024    MCV 84 08/30/2024     08/30/2024       CMP  Sodium   Date Value Ref Range Status   08/30/2024 142 136 - 145 mmol/L Final     Potassium   Date Value Ref Range Status   08/30/2024 3.9 3.5 - 5.1 mmol/L Final     Chloride   Date Value Ref Range Status   08/30/2024 106 95 - 110 mmol/L Final     CO2   Date Value Ref Range Status   08/30/2024 25 23 - 29 mmol/L Final     Glucose   Date Value Ref Range Status   08/30/2024 92 70 - 110 mg/dL Final     BUN   Date Value Ref Range Status   08/30/2024 19 8 - 23 mg/dL Final     Creatinine   Date Value Ref Range Status   08/30/2024 0.9 0.5 - 1.4 mg/dL Final     Calcium   Date Value Ref Range Status   08/30/2024 9.6 8.7 - 10.5 mg/dL Final     Total Protein   Date Value Ref Range Status   08/30/2024 7.0 6.0 - 8.4 g/dL Final     Albumin   Date Value Ref Range Status   08/30/2024 3.4 (L) 3.5 - 5.2 g/dL Final     Total Bilirubin   Date Value Ref Range Status   08/30/2024 0.4 0.1 - 1.0 mg/dL Final     Comment:     For infants and newborns, interpretation of results should be based  on gestational age, weight and in agreement with clinical  observations.    Premature Infant recommended reference ranges:  Up to 24 hours.............<8.0 mg/dL  Up to 48 hours............<12.0 mg/dL  3-5 days..................<15.0 mg/dL  6-29 days.................<15.0 mg/dL       Alkaline Phosphatase   Date Value Ref Range Status   08/30/2024 140 (H) 55 - 135 U/L Final     AST   Date Value  "Ref Range Status   08/30/2024 45 (H) 10 - 40 U/L Final     ALT   Date Value Ref Range Status   08/30/2024 27 10 - 44 U/L Final     Anion Gap   Date Value Ref Range Status   08/30/2024 11 8 - 16 mmol/L Final       Lab Results   Component Value Date    HGBA1C 6.2 (H) 08/30/2024     ASSESSMENT:       68 y.o. year old female with lower back pain, consistent with     1. Sacral insufficiency fracture, initial encounter  CT Pelvis Without Contrast    methocarbamoL (ROBAXIN) 500 MG Tab      2. Pelvic and perineal pain  CT Pelvis Without Contrast    methocarbamoL (ROBAXIN) 500 MG Tab      3. Renal cyst  US Retroperitoneal Complete    Ambulatory referral/consult to Urology          Sacral insufficiency fracture, initial encounter  -     CT Pelvis Without Contrast; Future; Expected date: 09/25/2024  -     methocarbamoL (ROBAXIN) 500 MG Tab; Take 1 tablet (500 mg total) by mouth 2 (two) times daily as needed (muscle spasms). May cause drowsiness.  Dispense: 60 tablet; Refill: 0    Pelvic and perineal pain  -     CT Pelvis Without Contrast; Future; Expected date: 09/25/2024  -     methocarbamoL (ROBAXIN) 500 MG Tab; Take 1 tablet (500 mg total) by mouth 2 (two) times daily as needed (muscle spasms). May cause drowsiness.  Dispense: 60 tablet; Refill: 0    Renal cyst  -     US Retroperitoneal Complete; Future; Expected date: 09/25/2024  -     Ambulatory referral/consult to Urology; Future; Expected date: 10/02/2024      PLAN:   - Interventions:   None at this time.  Pain appears consistent with sacral insufficiency fracture.     - Anticoagulation use:   No no anticoagulation    - Medications:  - Continue pregabalin 50 mg twice a day. Patient cannot tolerate increase due to medication making her feel "goofy."   - Previously d/c gabapentin.  - Temporarily stop Mobic 15 mg daily p.r.n.(do not recommend NSAIDs with acute fractures)   - Can take Tylenol (acetaminophen) 1000mg (two tablets of 500mg) 3x per day as needed for pain (to " take up to max dose of 3000mg per day).   - Start Robaxin (methocarbamol) 500 mg BID PRN muscle spasms (or can take 1/2 tablet).  she understands this could cause drowsiness. Other common potential deleterious side effects associated with this medication including dizziness, drowsiness, dry mouth, or tingling sensation in the upper or lower extremities.    - Continue using lidocaine patches daily as needed.    - Therapy:   - patient has completed 6 weeks of physician lead therapy with no relief.  - Discuss sacral insufficiency fracture with physical therapy - may want to hold on sessions at this time.    - Imaging/Diagnostic:  - MRI lumbar spine , x-rays Lumbar spine discussed and reviewed with patient and  today.  MRI significant for: L4-5: Mild disc degeneration with disc narrowing and desiccation.  Moderate facet arthrosis with grade 1 spondylolisthesis.  Mild central and foraminal stenosis.  L5-S1:    Moderate degenerative disc disease with mild facet arthrosis.  Mild right and mild-to-moderate left foraminal stenosis.  Abnormal marrow signal within the S2 sacral segment and in the left sacral ala concerning for insufficiency type pelvic fracture.    - US Renal ordered for further evaluation of renal cyst- staff informed to schedule prior to Urology appointment.    - Consults:   Referral to urology- renal cyst (R side) found on MRI L spine. Will also get US for further evaluation.     -Other:  Advised patient to purchase SI belt    - Patient Questions: Answered all of the patient's questions regarding diagnosis, therapy, and treatment     This condition does not require this patient to take time off of work, and the primary goal of our Pain Management services is to improve the patient's functional capacity.     - Follow up visit: return to clinic after CT pelvis.        The above plan and management options were discussed at length with patient. Patient is in agreement with the above and verbalized  understanding.      Emelyn Basilio PA-C  Interventional Pain Management  [facility]    Future Appointments   Date Time Provider Department Center   10/8/2024  9:00 AM [facility] US1 [facility] ULSOUND [place]   10/8/2024 10:00 AM [facility] CT1 LIMIT 500 LBS [facility] CT SCAN [place]   10/25/2024 12:40 PM Emelyn Basilio PA-C [facility] IN [facility]   3/3/2025  8:20 AM LAB[facility]AT[facility], [facility] [place]   3/10/2025  9:20 AM Buddy Ortiz MD [facility]

## 2024-09-25 ENCOUNTER — OFFICE VISIT (OUTPATIENT)
Dept: PAIN MEDICINE | Facility: CLINIC | Age: 69
End: 2024-09-25
Payer: MEDICARE

## 2024-09-25 VITALS
DIASTOLIC BLOOD PRESSURE: 77 MMHG | WEIGHT: 189.94 LBS | HEIGHT: 64 IN | BODY MASS INDEX: 32.43 KG/M2 | HEART RATE: 62 BPM | SYSTOLIC BLOOD PRESSURE: 115 MMHG | RESPIRATION RATE: 17 BRPM

## 2024-09-25 DIAGNOSIS — M84.48XA SACRAL INSUFFICIENCY FRACTURE, INITIAL ENCOUNTER: Primary | ICD-10-CM

## 2024-09-25 DIAGNOSIS — R10.2 PELVIC AND PERINEAL PAIN: ICD-10-CM

## 2024-09-25 DIAGNOSIS — N28.1 RENAL CYST: ICD-10-CM

## 2024-09-25 PROCEDURE — 99214 OFFICE O/P EST MOD 30 MIN: CPT | Mod: S$PBB,,, | Performed by: PHYSICIAN ASSISTANT

## 2024-09-25 PROCEDURE — 99999 PR PBB SHADOW E&M-EST. PATIENT-LVL V: CPT | Mod: PBBFAC,,, | Performed by: PHYSICIAN ASSISTANT

## 2024-09-25 PROCEDURE — 99215 OFFICE O/P EST HI 40 MIN: CPT | Mod: PBBFAC | Performed by: PHYSICIAN ASSISTANT

## 2024-09-25 RX ORDER — METHOCARBAMOL 500 MG/1
500 TABLET, FILM COATED ORAL 2 TIMES DAILY PRN
Qty: 60 TABLET | Refills: 0 | Status: SHIPPED | OUTPATIENT
Start: 2024-09-25

## 2024-10-08 ENCOUNTER — HOSPITAL ENCOUNTER (OUTPATIENT)
Dept: RADIOLOGY | Facility: HOSPITAL | Age: 69
Discharge: HOME | End: 2024-10-08
Attending: PHYSICIAN ASSISTANT
Payer: MEDICARE

## 2024-10-08 DIAGNOSIS — N28.1 RENAL CYST: ICD-10-CM

## 2024-10-08 DIAGNOSIS — R10.2 PELVIC AND PERINEAL PAIN: ICD-10-CM

## 2024-10-08 DIAGNOSIS — M84.48XA SACRAL INSUFFICIENCY FRACTURE, INITIAL ENCOUNTER: ICD-10-CM

## 2024-10-08 PROCEDURE — 72192 CT PELVIS W/O DYE: CPT | Mod: TC

## 2024-10-08 PROCEDURE — 76770 US EXAM ABDO BACK WALL COMP: CPT | Mod: TC

## 2024-10-08 PROCEDURE — 72192 CT PELVIS W/O DYE: CPT | Mod: 26,,, | Performed by: RADIOLOGY

## 2024-10-08 PROCEDURE — 76770 US EXAM ABDO BACK WALL COMP: CPT | Mod: 26,,, | Performed by: RADIOLOGY

## 2024-10-10 ENCOUNTER — OFFICE VISIT (OUTPATIENT)
Dept: UROLOGY | Facility: CLINIC | Age: 69
End: 2024-10-10
Payer: MEDICARE

## 2024-10-10 VITALS
HEIGHT: 64 IN | RESPIRATION RATE: 14 BRPM | TEMPERATURE: 98 F | SYSTOLIC BLOOD PRESSURE: 143 MMHG | HEART RATE: 72 BPM | BODY MASS INDEX: 32.43 KG/M2 | WEIGHT: 189.94 LBS | DIASTOLIC BLOOD PRESSURE: 85 MMHG

## 2024-10-10 DIAGNOSIS — N28.1 RENAL CYST: ICD-10-CM

## 2024-10-10 DIAGNOSIS — K76.0 FATTY LIVER: Primary | ICD-10-CM

## 2024-10-10 PROBLEM — R60.0 PEDAL EDEMA: Status: RESOLVED | Noted: 2024-03-06 | Resolved: 2024-10-10

## 2024-10-10 PROCEDURE — 99999 PR PBB SHADOW E&M-EST. PATIENT-LVL V: CPT | Mod: PBBFAC,,, | Performed by: NURSE PRACTITIONER

## 2024-10-10 PROCEDURE — 99215 OFFICE O/P EST HI 40 MIN: CPT | Mod: PBBFAC | Performed by: NURSE PRACTITIONER

## 2024-10-10 NOTE — PROGRESS NOTES
Chief Complaint:   Renal cyst    HPI:  Patient is a 68-year-old female that is presenting as an incidental finding of renal cyst.  Was followed up by primary care provider with imaging related to lower back pain.  Incidental finding of fatty liver and 4 cm simple renal cyst.  No  cancers in her family.    Allergies:  Shellfish containing products, Iodine, and Tuberculin    Medications:  has a current medication list which includes the following prescription(s): atorvastatin, cetirizine, cholecalciferol (vitamin d3), cyanocobalamin, diclofenac sodium, epinephrine, hydrochlorothiazide, meloxicam, methocarbamol, and pregabalin.    Review of Systems:  General: No fever, chills, fatigability, or weight loss.  Skin: No rashes, itching, or changes in color or texture of skin.  Chest: Denies VAZQUEZ, cyanosis, wheezing, cough, and sputum production.  Abdomen: Appetite fine. No weight loss. Denies diarrhea, abdominal pain, hematemesis, or blood in stool.  Musculoskeletal: No joint stiffness or swelling. Denies back pain.  : As above.  All other review of systems negative.    PMH:  Hyperlipidemia, shellfish allergy, osteoarthritis, obesity status post bariatric surgery, hypertensio, fatty liver and renal cyst    PSH:   has a past surgical history that includes Joint replacement and Colonoscopy (N/A, 3/27/2024).    FamHx: family history includes No Known Problems in her father and mother.    SocHx:  reports that she has never smoked. She has never used smokeless tobacco. She reports that she does not drink alcohol and does not use drugs.      Physical Exam:  Vitals:    10/10/24 1311   BP: (!) 143/85   Pulse: 72   Resp: 14   Temp: 98.3 °F (36.8 °C)     General: A&Ox3, no apparent distress, no deformities  Neck: No masses, normal thyroid  Lungs: normal inspiration, no use of accessory muscles  Heart: normal pulse, no arrhythmias  Abdomen: Soft, NT, ND, no masses, no hernias, no hepatosplenomegaly  Lymphatic: Neck and groin nodes  negative  Skin: The skin is warm and dry. No jaundice.  Ext: No c/c/e.      Labs/Studies:   10/08/2024  EXAMINATION:  US RETROPERITONEAL COMPLETE     CLINICAL HISTORY:  Cyst of kidney, acquired     TECHNIQUE:  Ultrasound of the kidneys and urinary bladder was performed including color flow and Doppler evaluation of the kidneys.     COMPARISON:  None.     FINDINGS:  Right kidney: The right kidney measures 9.2 cm. No cortical thinning. No loss of corticomedullary distinction. Resistive index measures 0.70.  4 cm no renal stone. No hydronephrosis.     Left kidney: The left kidney measures 9.2 cm. No cortical thinning. No loss of corticomedullary distinction. Resistive index measures 0.64.  No mass. No renal stone. No hydronephrosis.     The bladder is partially distended at the time of scanning and has an unremarkable appearance.     There is fatty infiltration of the liver.     Impression:     1.  4 cm midpole right renal cyst.     2.  Fatty infiltration of the liver.     3.  Otherwise, normal study.    Impression/Plan:   1. Fatty liver  Referral for hepatology placed    2.Renal Cysts   I reviewed the grading of cyst and reviewed the associated risk of a malignancy secondary to renal cysts.  I noted that simple renal cysts and minimally complex renal cysts essentially have no malignant potential (except when associated with familial renal cell carcinoma syndromes).  Patient to return to clinic in months with renal ultrasound.

## 2024-10-25 ENCOUNTER — OFFICE VISIT (OUTPATIENT)
Dept: HEPATOLOGY | Facility: CLINIC | Age: 69
End: 2024-10-25
Payer: MEDICARE

## 2024-10-25 ENCOUNTER — OFFICE VISIT (OUTPATIENT)
Dept: PAIN MEDICINE | Facility: CLINIC | Age: 69
End: 2024-10-25
Payer: MEDICARE

## 2024-10-25 VITALS — WEIGHT: 189 LBS | BODY MASS INDEX: 32.27 KG/M2 | HEIGHT: 64 IN

## 2024-10-25 VITALS
HEIGHT: 64 IN | WEIGHT: 193.13 LBS | HEART RATE: 76 BPM | RESPIRATION RATE: 16 BRPM | SYSTOLIC BLOOD PRESSURE: 120 MMHG | BODY MASS INDEX: 32.97 KG/M2 | DIASTOLIC BLOOD PRESSURE: 79 MMHG

## 2024-10-25 DIAGNOSIS — M84.48XD SACRAL INSUFFICIENCY FRACTURE WITH ROUTINE HEALING, SUBSEQUENT ENCOUNTER: Primary | ICD-10-CM

## 2024-10-25 DIAGNOSIS — R73.03 PRE-DIABETES: ICD-10-CM

## 2024-10-25 DIAGNOSIS — K76.0 METABOLIC DYSFUNCTION-ASSOCIATED STEATOTIC LIVER DISEASE (MASLD): Primary | ICD-10-CM

## 2024-10-25 DIAGNOSIS — K76.0 FATTY LIVER: ICD-10-CM

## 2024-10-25 DIAGNOSIS — E66.811 CLASS 1 OBESITY DUE TO EXCESS CALORIES WITH SERIOUS COMORBIDITY AND BODY MASS INDEX (BMI) OF 32.0 TO 32.9 IN ADULT: ICD-10-CM

## 2024-10-25 DIAGNOSIS — E78.00 HYPERCHOLESTEROLEMIA: ICD-10-CM

## 2024-10-25 DIAGNOSIS — R74.8 ELEVATED ALKALINE PHOSPHATASE LEVEL: ICD-10-CM

## 2024-10-25 DIAGNOSIS — R74.8 ELEVATED LIVER ENZYMES: ICD-10-CM

## 2024-10-25 DIAGNOSIS — E66.09 CLASS 1 OBESITY DUE TO EXCESS CALORIES WITH SERIOUS COMORBIDITY AND BODY MASS INDEX (BMI) OF 32.0 TO 32.9 IN ADULT: ICD-10-CM

## 2024-10-25 DIAGNOSIS — I10 ESSENTIAL (PRIMARY) HYPERTENSION: ICD-10-CM

## 2024-10-25 DIAGNOSIS — R10.2 PELVIC AND PERINEAL PAIN: ICD-10-CM

## 2024-10-25 PROBLEM — E66.9 OBESITY: Status: RESOLVED | Noted: 2024-09-18 | Resolved: 2024-10-25

## 2024-10-25 PROCEDURE — 99213 OFFICE O/P EST LOW 20 MIN: CPT | Mod: S$PBB,,, | Performed by: PHYSICIAN ASSISTANT

## 2024-10-25 PROCEDURE — 99213 OFFICE O/P EST LOW 20 MIN: CPT | Mod: PBBFAC | Performed by: PHYSICIAN ASSISTANT

## 2024-10-25 PROCEDURE — 99999 PR PBB SHADOW E&M-EST. PATIENT-LVL III: CPT | Mod: PBBFAC,,, | Performed by: PHYSICIAN ASSISTANT

## 2024-10-25 RX ORDER — PREGABALIN 50 MG/1
1 CAPSULE ORAL 2 TIMES DAILY
COMMUNITY
Start: 2024-10-01

## 2024-10-25 RX ORDER — OXYBUTYNIN CHLORIDE 5 MG/1
2.5 TABLET, EXTENDED RELEASE ORAL DAILY
COMMUNITY
Start: 2024-08-07

## 2024-10-25 NOTE — PATIENT INSTRUCTIONS
1. Fibroscan to look for fat or scar tissue in the liver with return to clinic   2. Will check immunity markers for Hepatitis A and B and arrange for vaccination if needed  3.   Follow up based on labs and fibroscan     These things are important to improve fatty liver:  Limit alcohol consumption   Weight loss goal of 20 lbs. [facility] offers benefits to patients so let me know if you want a referral to the [facility] gym. Also, [facility] has dieticians that can create a weight loss plan. If you are interested let me know and I can place a referral. If so, contact the dietician team at [facility] at email [email or web address] or call [phone]  to get scheduled. They do offer video visits   Avoid processed foods. No fried/fast foods. No sugary drinks or drinks with any calories.    Low carb/sugar and high protein diet (100 grams per day of protein).Try to limit your carb intake to LESS than  grams per day total. Use MyFitness Pal or Lose It maico to add up your carbs through the day. Do NOT drink any beverages with calories or carbs (this can lead to high blood sugar and weight gain). The main thing to focus on is high protein, low carb)  Exercise, 5 days per week, 30 minutes per day, as tolerated  Recommend good cholesterol, blood pressure, blood sugar levels   There is a new medication called Rezdiffra for the treatment of F2-F3 liver scarring due to fatty liver. It is only indicated for patients with significant scar tissue from fatty liver (will reassess after fibroscan)    In some people, fatty liver can progress to steatohepatitis (inflamatory fatty liver) and possibly to cirrhosis, putting one at increased risk for liver cancer, liver failure, and death. Therefore, the lifestyle changes are very important to decrease this risk.     Helpful website for MAS/fatty liver: [email or web address].ANPI.com/patient-resources/

## 2024-10-25 NOTE — Clinical Note
Can you please contact pt to schedule fibroscan in [place] soon with all labs on the same day in [place]? Can you message me what date she picks so that I can look out for the results. Thanks in advance!

## 2024-10-25 NOTE — PROGRESS NOTES
The patient location is: [place]  The chief complaint leading to consultation is: see below    Visit type: audiovisual    Face to Face time with patient: 30 minutes of total time spent on the encounter, which includes face to face time and non-face to face time preparing to see the patient (eg, review of tests), Obtaining and/or reviewing separately obtained history, Documenting clinical information in the electronic or other health record, Independently interpreting results (not separately reported) and communicating results to the patient/family/caregiver, or Care coordination (not separately reported).         Each patient to whom he or she provides medical services by telemedicine is:  (1) informed of the relationship between the physician and patient and the respective role of any other health care provider with respect to management of the patient; and (2) notified that he or she may decline to receive medical services by telemedicine and may withdraw from such care at any time.    Notes:      [facility] VISIT NEW PT NOTE    REFERRING PROVIDER:  Daysi Mars  PCP: Buddy Ortiz MD     CHIEF COMPLAINT: Metabolic associated steatotic liver disease, elevated liver enzymes, elevated alk phos       HPI: This is a 68 y.o. patient with PMH noted below, presenting for evaluation of above    Previous serologic w/u negative for Hep C - will screen for Hep B with next labs     Prior serologic workup:   Lab Results   Component Value Date    HEPCAB Non-reactive 08/30/2024     Risk factors for fatty liver include obesity, HTN, HLD, preDM    Liver fibrosis staging:  -- fibroscan soon     Interval HPI: Presents today alone via video visit   + SSB sweet tea   No fried/fast food   Some higher carb choices (fruit)  Exercising   Current wt 189 lbs       Labs done 8/2024 show elevated transaminase levels (AST>ALT) x1  Platelets WNL, alk phos mildly elevated x1  Synthetic liver functioning WNL    Lab Results  "  Component Value Date    ALT 27 08/30/2024    AST 45 (H) 08/30/2024    ALKPHOS 140 (H) 08/30/2024    BILITOT 0.4 08/30/2024    ALBUMIN 3.4 (L) 08/30/2024     08/30/2024       Abd U/S done 10/2024 showed fatty liver      Denies Alcohol consumption, see below  Social History     Substance and Sexual Activity   Alcohol Use Never       Immunity to Hep A and B - will check with next labs          Allergy and medication list reviewed and updated     PMHX:  has no past medical history on file.    PSHX:  has a past surgical history that includes Joint replacement and Colonoscopy (N/A, 3/27/2024).    FAMILY HISTORY: Updated and reviewed in EPIC    ROS:   GENERAL: Denies fatigue  CARDIOVASCULAR: Denies edema  GI: Denies abdominal pain  SKIN: Denies rash, itching   NEURO: Denies confusion, memory loss, or mood changes    PHYSICAL EXAM:   In no acute distress; alert and oriented to person, place and time  VITALS: Ht 5' 4" (1.626 m)   Wt 85.7 kg (189 lb)   BMI 32.44 kg/m²   EYES: Sclerae anicteric  GI: Soft, non-tender, non-distended. No ascites.  EXTREMITIES:  No edema.  SKIN: Warm and dry. No jaundice. No telangectasias noted. No palmar erythema.  NEURO:  No asterixis.  PSYCH:  Thought and speech pattern appropriate. Behavior normal      EDUCATION:  See instructions discussed with patient in Instructions section of the After Visit Summary     ASSESSMENT & PLAN:  68 y.o. female with:  1.  Metabolic associated steatotic liver disease   - see HPI  -- Immunity to Hep A and B : see HPI  -- Fibroscan soon in [place]  -- Recommendations discussed with patient:  Limit alcohol consumption  2 Weight loss goal of 20 lbs  3. Low carb/sugar, high fiber and protein diet, limit your carb intake to LESS than 30-45 grams of carbs with a meal or LESS than 5-10 grams with any snack   4. Exercise, 5 days per week, 30 minutes per day, as tolerated  5. Recommend good cholesterol, blood pressure, blood sugar levels   6. There is a new " medication called Rezdiffra for the treatment of F2-F3 liver scarring due to fatty liver. It is only indicated for those with stage 2 or 3 scar tissue (will reassess after fibroscan)    2. Elevated liver enzymes, alk phos x1  Will repeat       3. Obesity, HTN, HLD, preDM   -- There is no height or weight on file to calculate BMI.   -- increases risk for fatty liver        Follow up for pending results of workup. Based on labs and fibroscan results   Orders Placed This Encounter   Procedures    FibroScan Transplant Hepatology(Vibration Controlled Transient Elastography)    Hepatic Function Panel    Hepatitis A antibody, IgG    Hepatitis B Core Antibody, Total    Hepatitis B Surface Ab, Qualitative    Hepatitis B Surface Antigen        Thank you for allowing me to participate in the care of HENRIK Vail    I spent a total of 30 minutes on the day of the visit.This includes face to face time and non-face to face time preparing to see the patient (eg, review of tests), obtaining and/or reviewing separately obtained history, documenting clinical information in the electronic or other health record, independently interpreting results and communicating results to the patient/family/caregiver, and coordinating care.         CC'ed note to:   Daysi Mars NP Sledge, Edward David, MD

## 2024-10-25 NOTE — PROGRESS NOTES
Established Patient Interventional Pain Note (Follow up Visit)    Referring Physician: No ref. provider found    PCP: Buddy Ortiz MD    Chief Complaint:  CT review     SUBJECTIVE:    Interval History (10/25/2024): Pamela Lancaster presents today for follow-up visit.  Patient was last seen on 9/25/2024. At that visit, the plan was to complete a CT of pelvis. Patient reports pain as 9/10 today. Her pain is unchanged.  She denies any acute bladder/bowel changes.     Interval History (09/25/2024):   Pamela Lancaster presents today for follow-up visit.  Patient was last seen on 9/18/2024. At that visit, the plan was to complete a MRI of lumbar spine. Patient reports pain as 9/10 today.  She reports persistent pain in left buttock, posterior hip and SI joint. Patient states she woke up in pain about a month ago. Denies any recent falls or other trauma.   She does have intermittent leg pain that will radiate down her thigh but it usually stops above the knee.       Initial HPI (9/18/2024):  Pamela Lancaster is a 68 y.o. female who presents to the clinic for the evaluation of lower back pain.   Patient reports 2 month history lower back pain.  Patient denies any previous surgeries on her lumbar spine.  Patient reports having previous bilateral TKA.  Patient denies any significant inciting traumas to her lower back pain.    Pain is described as a aching stabbing pain that starts in the left lower back and left buttocks area.  This pain then radiates to the left groin and along the anterior aspect and lateral aspect to the middle of the left thigh.  Patient denies any significant right lower extremity pain.  Pain is worse with extension prolonged walking, better with sitting down and rest.  Pain is currently rated an 8/10.  Patient denies any fevers, chills, saddle anesthesia, or bowel and bladder incontinence.      Non-Pharmacologic Treatments:  Physical Therapy/Home Exercise: yes  Ice/Heat:yes  TENS:  no  Acupuncture: no  Massage: yes  Chiropractic: no        Previous Pain Medications:  Tylenol, ibuprofen, meloxicam, gabapentin, tizanidine, Flexeril, topicals    Pain Procedures:   none    Pain Disability Index Review:         9/25/2024     7:47 AM 9/18/2024    12:10 PM   Last 3 PDI Scores   Pain Disability Index (PDI) 54 63     Imaging/ Diagnostic Studies/ Labs (Reviewed on 10/25/2024):    CT Pelvis 10/8/2024  CT PELVIS WITHOUT CONTRAST     CLINICAL HISTORY:  Pelvis pain, stress fracture suspected, neg xray; Pelvic and perineal pain     TECHNIQUE:  Low dose axial images, sagittal and coronal reformations were obtained from the lower abdomen to the upper thighs.  Contrast was not administered.     All CT scans at this location are performed using dose modulation techniques as appropriate to a performed exam including the following: Automated exposure control; adjustment of the mA and/or kV according to patient size.     COMPARISON:  None     FINDINGS:  Bladder: No evidence of wall thickening.     Reproductive organs: Uterus surgically absent.  Ovaries unremarkable     GI Tract/Mesentery: Left lower quadrant small bowel anastomosis intact..  Normal appendix.  Sigmoid diverticulosis     Peritoneal Space: No ascites. No free air.     Retroperitoneum: No significant adenopathy.     Abdominal wall: Unremarkable.     Vasculature: No significant atherosclerosis or aneurysm.     Bones: Nondisplaced left sacral ala fracture with mild sclerosis.     Impression:     Nondisplaced left sacral ala fracture.  Bones appear osteopenic.      Results for orders placed during the hospital encounter of 09/20/24    MRI Lumbar Spine Without Contrast    Narrative  EXAMINATION:  MRI LUMBAR SPINE WITHOUT CONTRAST    CLINICAL HISTORY:  Radiculopathy, lumbar regionLow back pain, symptoms persist with > 6wks conservative treatment;Lumbar radiculopathy, symptoms persist with conservative treatment;    TECHNIQUE:  Standard multiplanar  noncontrast MRI sequences of the lumbar spine.    COMPARISON:  Plain x-ray 09/18/2024.    FINDINGS:  The distal cord and conus reveal normal signal and morphology.    There is grade 1 L4-5 spondylolisthesis.    There are type 2 endplate signal changes at L5-S1    There is subtle increased T 2 signal in the S2 sacral segment and extending into the left sacral ala.  Possible sacral insufficiency fracture.  Consider correlation with limited bone scan or MRI of the pelvis.    T12-L1: Minor disc desiccation.    L1-2:     Right renal cyst noted.    L2-3:     Minor disc desiccation and disc bulge with minor facet arthrosis.    L3-4:     Mild disc desiccation and disc bulge with mild facet arthrosis.    L4-5:     Mild disc degeneration with disc narrowing and desiccation.  Moderate facet arthrosis with grade 1 spondylolisthesis.  Mild central and foraminal stenosis.    L5-S1:    Moderate degenerative disc disease with mild facet arthrosis.  Mild right and mild-to-moderate left foraminal stenosis.    Impression  Abnormal marrow signal within the S2 sacral segment and in the left sacral ala concerning for insufficiency type pelvic fracture.  Consider confirmation with limited bone scan of the pelvis or MRI of the pelvis.  Degenerative disc and facet disease as above.      Electronically signed by: Donald Hall MD  Date:    09/23/2024  Time:    08:22        Results for orders placed during the hospital encounter of 09/18/24    X-Ray Lumbar Complete Including Flex And Ext    Narrative  EXAM:  XR LUMBAR SPINE 5 VIEW WITH FLEX AND EXT    CLINICAL HISTORY: Back pain.    FINDINGS:    Lumbar vertebral body height are maintained.  Grade 1 spondylolisthesis at L4/5 without evidence of spondylolysis.  Disc space narrowing and spondylosis throughout the lumbar spine.  Moderate facet arthropathy greatest at L5/S1 with suspected neuroforaminal narrowing.  No evidence of acute fracture.    Impression  As above    Finalized on:  9/18/2024 1:20 PM By:  Segundo Cheng MD  R# [id number]      2024-09-18 13:22:[id number]    [facility]      History reviewed. No pertinent past medical history.  Past Surgical History:   Procedure Laterality Date    COLONOSCOPY N/A 3/27/2024    Procedure: COLONOSCOPY;  Surgeon: Tay Mercado MD;  Location: [facility];  Service: General;  Laterality: N/A;    JOINT REPLACEMENT       Social History     Socioeconomic History    Marital status:    Tobacco Use    Smoking status: Never    Smokeless tobacco: Never   Substance and Sexual Activity    Alcohol use: Never    Drug use: Never    Sexual activity: Not Currently     Social Drivers of Health     Financial Resource Strain: Low Risk  (4/28/2024)    Overall Financial Resource Strain (CARDIA)     Difficulty of Paying Living Expenses: Not hard at all   Food Insecurity: No Food Insecurity (4/28/2024)    Hunger Vital Sign     Worried About Running Out of Food in the Last Year: Never true     Ran Out of Food in the Last Year: Never true   Transportation Needs: No Transportation Needs (4/28/2024)    PRAPARE - Transportation     Lack of Transportation (Medical): No     Lack of Transportation (Non-Medical): No   Physical Activity: Sufficiently Active (4/28/2024)    Exercise Vital Sign     Days of Exercise per Week: 6 days     Minutes of Exercise per Session: 30 min   Stress: No Stress Concern Present (4/28/2024)    [employer] [place] [employer] - Occupational Stress Questionnaire     Feeling of Stress : Not at all   Housing Stability: Unknown (4/28/2024)    Housing Stability Vital Sign     Unable to Pay for Housing in the Last Year: No     Family History   Problem Relation Name Age of Onset    No Known Problems Mother      No Known Problems Father         Review of patient's allergies indicates:   Allergen Reactions    Shellfish containing products Rash and Swelling     Other Reaction(s): Not available    Iodine      Other Reaction(s): Not available    Tuberculin         Current Outpatient Medications   Medication Sig    cetirizine (ZYRTEC) 10 MG tablet Take 1 tablet (10 mg total) by mouth once daily.    cholecalciferol, vitamin D3, (VITAMIN D3) 50 mcg (2,000 unit) Tab Take 2,000 Units by mouth once daily.    cyanocobalamin (VITAMIN B-12) 1000 MCG tablet Take 1 tablet by mouth once daily.    diclofenac sodium (SOLARAZE) 3 % gel Apply up to 4 times a day.    EPINEPHrine (EPIPEN JR) 0.15 mg/0.3 mL pen injection Inject 0.3 mLs (0.15 mg total) into the muscle as needed for Anaphylaxis.    hydroCHLOROthiazide (HYDRODIURIL) 50 MG tablet Take 0.5 tablets (25 mg total) by mouth once daily.    meloxicam (MOBIC) 15 MG tablet Take 1 tablet (15 mg total) by mouth once daily.    methocarbamoL (ROBAXIN) 500 MG Tab Take 1 tablet (500 mg total) by mouth 2 (two) times daily as needed (muscle spasms). May cause drowsiness.    oxybutynin (DITROPAN-XL) 5 MG TR24 Take 2.5 mg by mouth once daily.    pregabalin (LYRICA) 50 MG capsule Take 1 capsule (50 mg total) by mouth 2 (two) times daily.    pregabalin (LYRICA) 50 MG capsule Take 1 capsule by mouth 2 (two) times daily.    atorvastatin (LIPITOR) 40 MG tablet Take 1 tablet (40 mg total) by mouth once daily.     No current facility-administered medications for this visit.         ROS  Review of Systems   Constitutional:  Negative for chills, diaphoresis, fatigue and fever.   HENT:  Negative for ear discharge, ear pain, rhinorrhea, trouble swallowing and voice change.    Eyes:  Negative for pain and redness.   Respiratory:  Negative for chest tightness, shortness of breath, wheezing and stridor.    Cardiovascular:  Negative for chest pain and leg swelling.   Gastrointestinal:  Negative for blood in stool, diarrhea, nausea and vomiting.   Endocrine: Negative for cold intolerance and heat intolerance.   Genitourinary:  Negative for dysuria, hematuria and urgency.   Musculoskeletal:  Positive for arthralgias, back pain and gait problem. Negative for  "joint swelling, neck pain and neck stiffness.   Skin:  Negative for rash.   Neurological:  Negative for tremors, seizures, speech difficulty, light-headedness and headaches.   Hematological:  Does not bruise/bleed easily.   Psychiatric/Behavioral:  Negative for agitation, confusion and suicidal ideas.       OBJECTIVE:  /79   Pulse 76   Resp 16   Ht 5' 4" (1.626 m)   Wt 87.6 kg (193 lb 2 oz)   BMI 33.15 kg/m²     Physical Exam  Constitutional:       General: She is not in acute distress.     Appearance: Normal appearance. She is not ill-appearing.   HENT:      Head: Normocephalic and atraumatic.      Nose: No congestion or rhinorrhea.   Eyes:      Extraocular Movements: Extraocular movements intact.      Pupils: Pupils are equal, round, and reactive to light.   Pulmonary:      Effort: Pulmonary effort is normal.   Abdominal:      General: Abdomen is flat.      Palpations: Abdomen is soft.   Skin:     General: Skin is warm and dry.      Capillary Refill: Capillary refill takes less than 2 seconds.   Neurological:      General: No focal deficit present.      Mental Status: She is alert and oriented to person, place, and time.      Sensory: No sensory deficit.      Motor: Weakness present. No abnormal muscle tone.      Gait: Gait abnormal.      Deep Tendon Reflexes:      Reflex Scores:       Patellar reflexes are 2+ on the right side and 1+ on the left side.       Achilles reflexes are 2+ on the right side and 2+ on the left side.     Comments: 4/5 strength in left hip extension and left knee extension   Psychiatric:         Mood and Affect: Mood normal.         Behavior: Behavior normal.         Thought Content: Thought content normal.       Musculoskeletal:  Lumbar Exam  Incision: no  Pain with Flexion: Yes  Pain with Extension: yes, extension worse than flexion  ROM:  Decreased  Paraspinous TTP:  Positive on left lumbosacral area  Facet TTP:  L5-S1  Facet Loading:  Positive on the left  SLR:  " Absent    LABS:  Lab Results   Component Value Date    WBC 6.02 08/30/2024    HGB 11.5 (L) 08/30/2024    HCT 39.5 08/30/2024    MCV 84 08/30/2024     08/30/2024       CMP  Sodium   Date Value Ref Range Status   08/30/2024 142 136 - 145 mmol/L Final     Potassium   Date Value Ref Range Status   08/30/2024 3.9 3.5 - 5.1 mmol/L Final     Chloride   Date Value Ref Range Status   08/30/2024 106 95 - 110 mmol/L Final     CO2   Date Value Ref Range Status   08/30/2024 25 23 - 29 mmol/L Final     Glucose   Date Value Ref Range Status   08/30/2024 92 70 - 110 mg/dL Final     BUN   Date Value Ref Range Status   08/30/2024 19 8 - 23 mg/dL Final     Creatinine   Date Value Ref Range Status   08/30/2024 0.9 0.5 - 1.4 mg/dL Final     Calcium   Date Value Ref Range Status   08/30/2024 9.6 8.7 - 10.5 mg/dL Final     Total Protein   Date Value Ref Range Status   08/30/2024 7.0 6.0 - 8.4 g/dL Final     Albumin   Date Value Ref Range Status   08/30/2024 3.4 (L) 3.5 - 5.2 g/dL Final     Total Bilirubin   Date Value Ref Range Status   08/30/2024 0.4 0.1 - 1.0 mg/dL Final     Comment:     For infants and newborns, interpretation of results should be based  on gestational age, weight and in agreement with clinical  observations.    Premature Infant recommended reference ranges:  Up to 24 hours.............<8.0 mg/dL  Up to 48 hours............<12.0 mg/dL  3-5 days..................<15.0 mg/dL  6-29 days.................<15.0 mg/dL       Alkaline Phosphatase   Date Value Ref Range Status   08/30/2024 140 (H) 55 - 135 U/L Final     AST   Date Value Ref Range Status   08/30/2024 45 (H) 10 - 40 U/L Final     ALT   Date Value Ref Range Status   08/30/2024 27 10 - 44 U/L Final     Anion Gap   Date Value Ref Range Status   08/30/2024 11 8 - 16 mmol/L Final       Lab Results   Component Value Date    HGBA1C 6.2 (H) 08/30/2024     ASSESSMENT:       68 y.o. year old female with lower back pain, consistent with     1. Sacral insufficiency  "fracture with routine healing, subsequent encounter        2. Pelvic and perineal pain          PLAN:   - Interventions:   None at this time.  Pain appears consistent with sacral insufficiency fracture.     - Anticoagulation use:   No no anticoagulation    - Medications:  - Continue pregabalin 50 mg twice a day. Patient cannot tolerate increase due to medication making her feel "goofy."   - Previously d/c gabapentin.  - Temporarily stop Mobic 15 mg daily p.r.n.(do not recommend NSAIDs with acute fractures)   - Can take Tylenol (acetaminophen) 1000mg (two tablets of 500mg) 3x per day as needed for pain (to take up to max dose of 3000mg per day).   - Continue Robaxin (methocarbamol) 500 mg BID PRN muscle spasms (or can take 1/2 tablet).  she understands this could cause drowsiness. Other common potential deleterious side effects associated with this medication including dizziness, drowsiness, dry mouth, or tingling sensation in the upper or lower extremities.    - Continue using lidocaine patches daily as needed.    - Therapy:   - Discuss sacral insufficiency fracture with physical therapy - may want to hold on sessions at this time.    - Imaging/Diagnostic:   CT Pelvis discussed and reviewed today. Findings reveal:  Nondisplaced left sacral ala fracture with mild sclerosis.    MRI significant for: L4-5: Mild disc degeneration with disc narrowing and desiccation.  Moderate facet arthrosis with grade 1 spondylolisthesis.  Mild central and foraminal stenosis.  L5-S1:    Moderate degenerative disc disease with mild facet arthrosis.  Mild right and mild-to-moderate left foraminal stenosis.  Abnormal marrow signal within the S2 sacral segment and in the left sacral ala concerning for insufficiency type pelvic fracture.      -Other:  Advised patient to purchase doughnut and to avoid sitting on hard surfaces.    - Patient Questions: Answered all of the patient's questions regarding diagnosis, therapy, and treatment     This " condition does not require this patient to take time off of work, and the primary goal of our Pain Management services is to improve the patient's functional capacity.     - Follow up visit: return to clinic in 3 months or sooner if needed.        The above plan and management options were discussed at length with patient. Patient is in agreement with the above and verbalized understanding.      Emelyn Basilio PA-C  Interventional Pain Management  [facility]        Future Appointments   Date Time Provider Department Center   1/27/2025  9:40 AM Emelyn Basilio PA-C [facility] IN [facility]   3/3/2025  8:20 AM LABORATORY, [facility] LAB [facility]   3/10/2025  9:20 AM Buddy Ortiz MD [facility] [place]   10/6/2025 12:00 PM ON[facility] US1 [facility] ULSOUND [facility]   10/9/2025  2:00 PM Daysi Mars NP [facility] UROLOGY [facility]

## 2024-11-12 ENCOUNTER — TELEPHONE (OUTPATIENT)
Dept: HEPATOLOGY | Facility: CLINIC | Age: 69
End: 2024-11-12
Payer: MEDICARE

## 2024-11-12 ENCOUNTER — PROCEDURE VISIT (OUTPATIENT)
Dept: HEPATOLOGY | Facility: CLINIC | Age: 69
End: 2024-11-12
Payer: MEDICARE

## 2024-11-12 ENCOUNTER — LAB VISIT (OUTPATIENT)
Dept: LAB | Facility: HOSPITAL | Age: 69
End: 2024-11-12
Attending: NURSE PRACTITIONER
Payer: MEDICARE

## 2024-11-12 VITALS — WEIGHT: 193.13 LBS | HEIGHT: 64 IN | BODY MASS INDEX: 32.97 KG/M2

## 2024-11-12 DIAGNOSIS — K76.0 METABOLIC DYSFUNCTION-ASSOCIATED STEATOTIC LIVER DISEASE (MASLD): ICD-10-CM

## 2024-11-12 DIAGNOSIS — K76.0 METABOLIC DYSFUNCTION-ASSOCIATED STEATOTIC LIVER DISEASE (MASLD): Primary | ICD-10-CM

## 2024-11-12 LAB
ALBUMIN SERPL BCP-MCNC: 3.7 G/DL (ref 3.5–5.2)
ALP SERPL-CCNC: 158 U/L (ref 40–150)
ALT SERPL W/O P-5'-P-CCNC: 28 U/L (ref 10–44)
AST SERPL-CCNC: 43 U/L (ref 10–40)
BILIRUB DIRECT SERPL-MCNC: 0.3 MG/DL (ref 0.1–0.3)
BILIRUB SERPL-MCNC: 0.6 MG/DL (ref 0.1–1)
HAV IGG SER QL IA: NORMAL
HBV CORE AB SERPL QL IA: NORMAL
HBV SURFACE AB SER-ACNC: <3 MIU/ML
HBV SURFACE AB SER-ACNC: NORMAL M[IU]/ML
HBV SURFACE AG SERPL QL IA: NORMAL
PROT SERPL-MCNC: 7.8 G/DL (ref 6–8.4)

## 2024-11-12 PROCEDURE — 91200 LIVER ELASTOGRAPHY: CPT | Mod: 26,S$PBB,, | Performed by: NURSE PRACTITIONER

## 2024-11-12 PROCEDURE — 86790 VIRUS ANTIBODY NOS: CPT | Performed by: NURSE PRACTITIONER

## 2024-11-12 PROCEDURE — 86706 HEP B SURFACE ANTIBODY: CPT | Mod: 91 | Performed by: NURSE PRACTITIONER

## 2024-11-12 PROCEDURE — 91200 LIVER ELASTOGRAPHY: CPT | Mod: PBBFAC | Performed by: INTERNAL MEDICINE

## 2024-11-12 PROCEDURE — 91200 LIVER ELASTOGRAPHY: CPT | Mod: PBBFAC

## 2024-11-12 PROCEDURE — 86704 HEP B CORE ANTIBODY TOTAL: CPT | Performed by: NURSE PRACTITIONER

## 2024-11-12 PROCEDURE — 87340 HEPATITIS B SURFACE AG IA: CPT | Performed by: NURSE PRACTITIONER

## 2024-11-12 PROCEDURE — 91200 LIVER ELASTOGRAPHY: CPT | Mod: 26,S$PBB,, | Performed by: INTERNAL MEDICINE

## 2024-11-12 PROCEDURE — 36415 COLL VENOUS BLD VENIPUNCTURE: CPT | Performed by: NURSE PRACTITIONER

## 2024-11-12 PROCEDURE — 80076 HEPATIC FUNCTION PANEL: CPT | Performed by: NURSE PRACTITIONER

## 2024-11-12 NOTE — TELEPHONE ENCOUNTER
Results of [facility]roscan [facility] with explanation    Please contact pt to schedule f/u with me in 1 year with fibroscan before in [place] or same day here    Thanks !

## 2024-11-12 NOTE — PROCEDURES
FibroScan Transplant Hepatology(Vibration Controlled Transient Elastography)    Date/Time: 11/12/2024 1:30 PM    Performed by: Lavinia Domingo RN  Authorized by: Shazia Paz NP    Diagnosis:  NAFLD    Probe:  XL    Universal Protocol: Patient's identity, procedure and site were verified, confirmatory pause was performed.  Discussed procedure including risks and potential complications.  Questions answered.  Patient verbalizes understanding and wishes to proceed with VCTE.     Procedure: After providing explanations of the procedure, patient was placed in the supine position with right arm in maximum abduction to allow optimal exposure of right lateral abdomen.  Patient was briefly assessed, Testing was performed in the mid-axillary location, 50Hz Shear Wave pulses were applied and the resulting Shear Wave and Propagation Speed detected with a 3.5 MHz ultrasonic signal, using the FibroScan probe, Skin to liver capsule distance and liver parenchyma were accessed during the entire examination with the FibroScan probe, Patient was instructed to breathe normally and to abstain from sudden movements during the procedure, allowing for random measurements of liver stiffness. At least 10 Shear Waves were produced, Individual measurements of each Shear Wave were calculated.  Patient tolerated the procedure well with no complications.  Meets discharge criteria as was dismissed.  Rates pain 0 out of 10.  Patient will follow up with ordering provider to review results.    Findings  Median liver stiffness score:  5.6  CAP Reading: dB/m:  342    IQR/med %:  15  Interpretation  Fibrosis interpretation is based on medial liver stiffness - Kilopascal (kPa).    Fibrosis Stage:  F 0-1  Steatosis interpretation is based on controlled attenuation parameter - (dB/m).    Steatosis Grade:  S3  Comments/Plan:  Severe steatosis but no fibrsis

## 2024-11-13 ENCOUNTER — PATIENT MESSAGE (OUTPATIENT)
Dept: HEPATOLOGY | Facility: CLINIC | Age: 69
End: 2024-11-13
Payer: MEDICARE

## 2024-11-13 DIAGNOSIS — R74.8 ELEVATED ALKALINE PHOSPHATASE LEVEL: Primary | ICD-10-CM

## 2024-11-13 DIAGNOSIS — Z23 NEED FOR HEPATITIS A AND B VACCINATION: ICD-10-CM

## 2024-12-10 DIAGNOSIS — R10.2 PELVIC AND PERINEAL PAIN: ICD-10-CM

## 2024-12-10 DIAGNOSIS — M84.48XA SACRAL INSUFFICIENCY FRACTURE, INITIAL ENCOUNTER: ICD-10-CM

## 2024-12-10 RX ORDER — METHOCARBAMOL 500 MG/1
500 TABLET, FILM COATED ORAL 2 TIMES DAILY PRN
Qty: 60 TABLET | Refills: 2 | Status: SHIPPED | OUTPATIENT
Start: 2024-12-10

## 2025-01-27 ENCOUNTER — PATIENT MESSAGE (OUTPATIENT)
Dept: PAIN MEDICINE | Facility: CLINIC | Age: 70
End: 2025-01-27

## 2025-01-27 ENCOUNTER — OFFICE VISIT (OUTPATIENT)
Dept: PAIN MEDICINE | Facility: CLINIC | Age: 70
End: 2025-01-27
Payer: MEDICARE

## 2025-01-27 DIAGNOSIS — R10.2 PELVIC AND PERINEAL PAIN: ICD-10-CM

## 2025-01-27 DIAGNOSIS — M84.48XG SACRAL INSUFFICIENCY FRACTURE WITH DELAYED HEALING, SUBSEQUENT ENCOUNTER: ICD-10-CM

## 2025-01-27 DIAGNOSIS — M47.812 CERVICAL SPONDYLOSIS: Primary | ICD-10-CM

## 2025-01-27 PROCEDURE — 98006 SYNCH AUDIO-VIDEO EST MOD 30: CPT | Mod: 95,,, | Performed by: PHYSICIAN ASSISTANT

## 2025-01-27 NOTE — PROGRESS NOTES
"Established Patient Interventional Pain Note (Follow up Visit)    PCP: Buddy Ortiz MD    The patient location is: [place]  The chief complaint leading to consultation is: 3 month follow up    Visit type: audiovisual    The  encounter, which includes face to face time and non-face to face time preparing to see the patient (eg, review of tests), Obtaining and/or reviewing separately obtained history, Documenting clinical information in the electronic or other health record, Independently interpreting results (not separately reported) and communicating results to the patient/family/caregiver, or Care coordination (not separately reported).     Each patient to whom he or she provides medical services by telemedicine is:  (1) informed of the relationship between the physician and patient and the respective role of any other health care provider with respect to management of the patient; and (2) notified that he or she may decline to receive medical services by telemedicine and may withdraw from such care at any time.    Notes:     SUBJECTIVE:  Interval History (1/27/2025):  Pamela Lancaster presents today for follow-up visit.  Patient was last seen on 10/25/2024.  Patient reports pain as 7/10 today. She has pain across posterior neck (R/L sides). She denies pain radiating down her arms but has tingling in the fingers. Patient reports previous nerve conduction study which revealed CTS.    Patient has c/o neck pain (moderate to severe in intensity) over the past 3 months.     She still c/o "pinching and pulling" in the sacral area. She has increased pain with sitting and has to shift more to the right side often.     Interval History (10/25/2024): Pamela Lancaster presents today for follow-up visit.   At that visit, the plan was to complete a CT of pelvis. Patient reports pain as 9/10 today. Her pain is unchanged.  She denies any acute bladder/bowel changes.     Interval History (09/25/2024):   Pamela Lancaster " Tonny presents today for follow-up visit.  Patient was last seen on 9/18/2024. At that visit, the plan was to complete a MRI of lumbar spine. Patient reports pain as 9/10 today.  She reports persistent pain in left buttock, posterior hip and SI joint. Patient states she woke up in pain about a month ago. Denies any recent falls or other trauma.   She does have intermittent leg pain that will radiate down her thigh but it usually stops above the knee.       Initial HPI (9/18/2024):  Pamela Lancaster is a 69 y.o. female who presents to the clinic for the evaluation of lower back pain.   Patient reports 2 month history lower back pain.  Patient denies any previous surgeries on her lumbar spine.  Patient reports having previous bilateral TKA.  Patient denies any significant inciting traumas to her lower back pain.    Pain is described as a aching stabbing pain that starts in the left lower back and left buttocks area.  This pain then radiates to the left groin and along the anterior aspect and lateral aspect to the middle of the left thigh.  Patient denies any significant right lower extremity pain.  Pain is worse with extension prolonged walking, better with sitting down and rest.  Pain is currently rated an 8/10.  Patient denies any fevers, chills, saddle anesthesia, or bowel and bladder incontinence.      Non-Pharmacologic Treatments:  Physical Therapy/Home Exercise: yes  Ice/Heat:yes  TENS: no  Acupuncture: no  Massage: yes  Chiropractic: no        Previous Pain Medications:  Tylenol, ibuprofen, meloxicam, gabapentin, tizanidine, Flexeril, topicals    Pain Procedures:   none    Pain Disability Index Review:         9/25/2024     7:47 AM 9/18/2024    12:10 PM   Last 3 PDI Scores   Pain Disability Index (PDI) 54 63     Imaging/ Diagnostic Studies/ Labs (Reviewed on 1/27/2025):    CT Pelvis 10/8/2024  CT PELVIS WITHOUT CONTRAST     CLINICAL HISTORY:  Pelvis pain, stress fracture suspected, neg xray; Pelvic and  perineal pain     TECHNIQUE:  Low dose axial images, sagittal and coronal reformations were obtained from the lower abdomen to the upper thighs.  Contrast was not administered.     All CT scans at this location are performed using dose modulation techniques as appropriate to a performed exam including the following: Automated exposure control; adjustment of the mA and/or kV according to patient size.     COMPARISON:  None     FINDINGS:  Bladder: No evidence of wall thickening.     Reproductive organs: Uterus surgically absent.  Ovaries unremarkable     GI Tract/Mesentery: Left lower quadrant small bowel anastomosis intact..  Normal appendix.  Sigmoid diverticulosis     Peritoneal Space: No ascites. No free air.     Retroperitoneum: No significant adenopathy.     Abdominal wall: Unremarkable.     Vasculature: No significant atherosclerosis or aneurysm.     Bones: Nondisplaced left sacral ala fracture with mild sclerosis.     Impression:     Nondisplaced left sacral ala fracture.  Bones appear osteopenic.      Results for orders placed during the hospital encounter of 09/20/24    MRI Lumbar Spine Without Contrast    Narrative  EXAMINATION:  MRI LUMBAR SPINE WITHOUT CONTRAST    CLINICAL HISTORY:  Radiculopathy, lumbar regionLow back pain, symptoms persist with > 6wks conservative treatment;Lumbar radiculopathy, symptoms persist with conservative treatment;    TECHNIQUE:  Standard multiplanar noncontrast MRI sequences of the lumbar spine.    COMPARISON:  Plain x-ray 09/18/2024.    FINDINGS:  The distal cord and conus reveal normal signal and morphology.    There is grade 1 L4-5 spondylolisthesis.    There are type 2 endplate signal changes at L5-S1    There is subtle increased T 2 signal in the S2 sacral segment and extending into the left sacral ala.  Possible sacral insufficiency fracture.  Consider correlation with limited bone scan or MRI of the pelvis.    T12-L1: Minor disc desiccation.    L1-2:     Right renal  cyst noted.    L2-3:     Minor disc desiccation and disc bulge with minor facet arthrosis.    L3-4:     Mild disc desiccation and disc bulge with mild facet arthrosis.    L4-5:     Mild disc degeneration with disc narrowing and desiccation.  Moderate facet arthrosis with grade 1 spondylolisthesis.  Mild central and foraminal stenosis.    L5-S1:    Moderate degenerative disc disease with mild facet arthrosis.  Mild right and mild-to-moderate left foraminal stenosis.    Impression  Abnormal marrow signal within the S2 sacral segment and in the left sacral ala concerning for insufficiency type pelvic fracture.  Consider confirmation with limited bone scan of the pelvis or MRI of the pelvis.  Degenerative disc and facet disease as above.      Electronically signed by: Donald Hall MD  Date:    09/23/2024  Time:    08:22        Results for orders placed during the hospital encounter of 09/18/24    X-Ray Lumbar Complete Including Flex And Ext    Narrative  EXAM:  XR LUMBAR SPINE 5 VIEW WITH FLEX AND EXT    CLINICAL HISTORY: Back pain.    FINDINGS:    Lumbar vertebral body height are maintained.  Grade 1 spondylolisthesis at L4/5 without evidence of spondylolysis.  Disc space narrowing and spondylosis throughout the lumbar spine.  Moderate facet arthropathy greatest at L5/S1 with suspected neuroforaminal narrowing.  No evidence of acute fracture.    Impression  As above    Finalized on: 9/18/2024 1:20 PM By:  Segundo Cheng MD  BRRG# [id number]      2024-09-18 13:22:[id number]    R      No past medical history on file.  Past Surgical History:   Procedure Laterality Date    COLONOSCOPY N/A 3/27/2024    Procedure: COLONOSCOPY;  Surgeon: Tay Mercado MD;  Location: [facility];  Service: General;  Laterality: N/A;    JOINT REPLACEMENT       Social History     Socioeconomic History    Marital status:    Tobacco Use    Smoking status: Never    Smokeless tobacco: Never   Substance and Sexual Activity    Alcohol use:  Never    Drug use: Never    Sexual activity: Not Currently     Social Drivers of Health     Financial Resource Strain: Low Risk  (4/28/2024)    Overall Financial Resource Strain (CARDIA)     Difficulty of Paying Living Expenses: Not hard at all   Food Insecurity: No Food Insecurity (4/28/2024)    Hunger Vital Sign     Worried About Running Out of Food in the Last Year: Never true     Ran Out of Food in the Last Year: Never true   Transportation Needs: No Transportation Needs (4/28/2024)    PRAPARE - Transportation     Lack of Transportation (Medical): No     Lack of Transportation (Non-Medical): No   Physical Activity: Sufficiently Active (4/28/2024)    Exercise Vital Sign     Days of Exercise per Week: 6 days     Minutes of Exercise per Session: 30 min   Stress: No Stress Concern Present (4/28/2024)    [employer] - Occupational Stress Questionnaire     Feeling of Stress : Not at all   Housing Stability: Unknown (4/28/2024)    Housing Stability Vital Sign     Unable to Pay for Housing in the Last Year: No     Family History   Problem Relation Name Age of Onset    No Known Problems Mother      No Known Problems Father         Review of patient's allergies indicates:   Allergen Reactions    Shellfish containing products Rash and Swelling     Other Reaction(s): Not available    Iodine      Other Reaction(s): Not available    Tuberculin        Current Outpatient Medications   Medication Sig    atorvastatin (LIPITOR) 40 MG tablet Take 1 tablet (40 mg total) by mouth once daily.    cetirizine (ZYRTEC) 10 MG tablet Take 1 tablet (10 mg total) by mouth once daily.    cholecalciferol, vitamin D3, (VITAMIN D3) 50 mcg (2,000 unit) Tab Take 2,000 Units by mouth once daily.    cyanocobalamin (VITAMIN B-12) 1000 MCG tablet Take 1 tablet by mouth once daily.    diclofenac sodium (SOLARAZE) 3 % gel Apply up to 4 times a day.    EPINEPHrine (EPIPEN JR) 0.15 mg/0.3 mL pen injection Inject 0.3 mLs (0.15  mg total) into the muscle as needed for Anaphylaxis.    hydroCHLOROthiazide (HYDRODIURIL) 50 MG tablet Take 0.5 tablets (25 mg total) by mouth once daily.    meloxicam (MOBIC) 15 MG tablet Take 1 tablet (15 mg total) by mouth once daily.    methocarbamoL (ROBAXIN) 500 MG Tab Take 1 tablet (500 mg total) by mouth 2 (two) times daily as needed (muscle spasms). May cause drowsiness.    oxybutynin (DITROPAN-XL) 5 MG TR24 Take 2.5 mg by mouth once daily.    pregabalin (LYRICA) 50 MG capsule Take 1 capsule (50 mg total) by mouth 2 (two) times daily.    pregabalin (LYRICA) 50 MG capsule Take 1 capsule by mouth 2 (two) times daily.     No current facility-administered medications for this visit.         ROS  Review of Systems   Constitutional:  Negative for chills, diaphoresis, fatigue and fever.   HENT:  Negative for ear discharge, ear pain, rhinorrhea, trouble swallowing and voice change.    Eyes:  Negative for pain and redness.   Respiratory:  Negative for chest tightness, shortness of breath, wheezing and stridor.    Cardiovascular:  Negative for chest pain and leg swelling.   Gastrointestinal:  Negative for blood in stool, diarrhea, nausea and vomiting.   Endocrine: Negative for cold intolerance and heat intolerance.   Genitourinary:  Negative for dysuria, hematuria and urgency.   Musculoskeletal:  Positive for arthralgias, back pain, gait problem, neck pain and neck stiffness. Negative for joint swelling.   Skin:  Negative for rash.   Neurological:  Negative for tremors, seizures, speech difficulty, light-headedness and headaches.   Hematological:  Does not bruise/bleed easily.   Psychiatric/Behavioral:  Negative for agitation, confusion and suicidal ideas.       OBJECTIVE:    Telemedicine Physical Exam:   There were no vitals filed for this visit.  There is no height or weight on file to calculate BMI.   (reviewed on 1/27/2025)     (Physical exam performed virtually with patient guided on specific actions and  diagnostic maneuvers)  GENERAL: Well appearing, in no acute distress, alert and oriented x3.  Cooperative.  PSYCH:  Mood and affect appropriate.  SKIN: Skin color & texture with no obvious abnormalities.    HEAD/FACE:  Normocephalic, atraumatic.    PULM:  No difficulty breathing. No nasal flaring. No obvious wheezing.  EXTREMITIES: No obvious deformities. Moving all extremities well, appears to have symmetric strength throughout.  MUSCULOSKELETAL: Pain with Cervical ROM.  NEURO: No obvious neurologic deficit.   GAIT: sitting.     Physical Exam: last in clinic visit:    Physical Exam  Constitutional:       General: She is not in acute distress.     Appearance: Normal appearance. She is not ill-appearing.   HENT:      Head: Normocephalic and atraumatic.      Nose: No congestion or rhinorrhea.   Eyes:      Extraocular Movements: Extraocular movements intact.      Pupils: Pupils are equal, round, and reactive to light.   Pulmonary:      Effort: Pulmonary effort is normal.   Abdominal:      General: Abdomen is flat.      Palpations: Abdomen is soft.   Skin:     General: Skin is warm and dry.      Capillary Refill: Capillary refill takes less than 2 seconds.   Neurological:      General: No focal deficit present.      Mental Status: She is alert and oriented to person, place, and time.      Sensory: No sensory deficit.      Motor: Weakness present. No abnormal muscle tone.      Gait: Gait abnormal.      Deep Tendon Reflexes:      Reflex Scores:       Patellar reflexes are 2+ on the right side and 1+ on the left side.       Achilles reflexes are 2+ on the right side and 2+ on the left side.     Comments: 4/5 strength in left hip extension and left knee extension   Psychiatric:         Mood and Affect: Mood normal.         Behavior: Behavior normal.         Thought Content: Thought content normal.     Musculoskeletal:  Lumbar Exam  Incision: no  Pain with Flexion: Yes  Pain with Extension: yes, extension worse than  flexion  ROM:  Decreased  Paraspinous TTP:  Positive on left lumbosacral area  Facet TTP:  L5-S1  Facet Loading:  Positive on the left  SLR:  Absent    LABS:  Lab Results   Component Value Date    WBC 6.02 08/30/2024    HGB 11.5 (L) 08/30/2024    HCT 39.5 08/30/2024    MCV 84 08/30/2024     08/30/2024       CMP  Sodium   Date Value Ref Range Status   08/30/2024 142 136 - 145 mmol/L Final     Potassium   Date Value Ref Range Status   08/30/2024 3.9 3.5 - 5.1 mmol/L Final     Chloride   Date Value Ref Range Status   08/30/2024 106 95 - 110 mmol/L Final     CO2   Date Value Ref Range Status   08/30/2024 25 23 - 29 mmol/L Final     Glucose   Date Value Ref Range Status   08/30/2024 92 70 - 110 mg/dL Final     BUN   Date Value Ref Range Status   08/30/2024 19 8 - 23 mg/dL Final     Creatinine   Date Value Ref Range Status   08/30/2024 0.9 0.5 - 1.4 mg/dL Final     Calcium   Date Value Ref Range Status   08/30/2024 9.6 8.7 - 10.5 mg/dL Final     Total Protein   Date Value Ref Range Status   11/12/2024 7.8 6.0 - 8.4 g/dL Final     Albumin   Date Value Ref Range Status   11/12/2024 3.7 3.5 - 5.2 g/dL Final     Total Bilirubin   Date Value Ref Range Status   11/12/2024 0.6 0.1 - 1.0 mg/dL Final     Comment:     For infants and newborns, interpretation of results should be based  on gestational age, weight and in agreement with clinical  observations.    Premature Infant recommended reference ranges:  Up to 24 hours.............<8.0 mg/dL  Up to 48 hours............<12.0 mg/dL  3-5 days..................<15.0 mg/dL  6-29 days.................<15.0 mg/dL       Alkaline Phosphatase   Date Value Ref Range Status   11/12/2024 158 (H) 40 - 150 U/L Final     AST   Date Value Ref Range Status   11/12/2024 43 (H) 10 - 40 U/L Final     ALT   Date Value Ref Range Status   11/12/2024 28 10 - 44 U/L Final     Anion Gap   Date Value Ref Range Status   08/30/2024 11 8 - 16 mmol/L Final       Lab Results   Component Value Date     "HGBA1C 6.2 (H) 08/30/2024     ASSESSMENT:       69 y.o. year old female with lower back pain, consistent with     1. Cervical spondylosis  Case Request-RAD/Other Procedure Area: bilateral Cervical C4-6 medial branch block      2. Sacral insufficiency fracture with delayed healing, subsequent encounter  MRI Sacrum/Coccyx (Bony) W W/O Contrast    Creatinine, Serum      3. Pelvic and perineal pain  MRI Sacrum/Coccyx (Bony) W W/O Contrast    Creatinine, Serum          PLAN:   - Interventions:   - Schedule Diagnostic bilateral  Cervical C4-6  #1. Previously explained the risks and benefits of the procedure in detail with the patient in clinic along with alternative treatment options, and the patient elected to pursue the intervention at this time.  At this time, cervical pain is moderate to severe & more axial in nature, and I think patient would benefit more from  facet joint treatment for cervical facet-mediated pain. Patient's ADLs are affected by this pain. She has been experiencing this pain for at least 3 months.   Call patient to get for % relief to determine potential RFA eligibility.  1st MBB: if > 80% pain relief, schedule 2nd diagnostic MBB   2nd MBB: if > 80% pain relief, schedule bilateral cervical RFA.       - Anticoagulation use:   No no anticoagulation    - Medications:  - Continue pregabalin, will instruct to take 100 mg QHS . Patient cannot tolerate increase due to medication making her feel "goofy."   - Previously d/c gabapentin.  - Temporarily stop Mobic 15 mg daily p.r.n.(do not recommend NSAIDs with acute fractures)   - Can take Tylenol (acetaminophen) 1000mg (two tablets of 500mg) 3x per day as needed for pain (to take up to max dose of 3000mg per day).   - Continue Robaxin (methocarbamol) 500 mg BID PRN muscle spasms (or can take 1/2 tablet).  she understands this could cause drowsiness. Other common potential deleterious side effects associated with this medication including dizziness, " drowsiness, dry mouth, or tingling sensation in the upper or lower extremities.    - Continue using lidocaine patches daily as needed.    - Therapy:   - Discuss sacral insufficiency fracture with physical therapy - may want to hold on sessions at this time.    - Imaging/Diagnostic:  MRI Sacrum/coccyx with and without contrast ordered for better evaluation of sacral ala fracture.     CT Pelvis discussed and reviewed previously Findings reveal:  Nondisplaced left sacral ala fracture with mild sclerosis.  MRI significant for: L4-5: Mild disc degeneration with disc narrowing and desiccation.  Moderate facet arthrosis with grade 1 spondylolisthesis.  Mild central and foraminal stenosis.  L5-S1:    Moderate degenerative disc disease with mild facet arthrosis.  Mild right and mild-to-moderate left foraminal stenosis.  Abnormal marrow signal within the S2 sacral segment and in the left sacral ala concerning for insufficiency type pelvic fracture.      -Other:  Advised patient to purchase doughnut and to avoid sitting on hard surfaces.    - Patient Questions: Answered all of the patient's questions regarding diagnosis, therapy, and treatment     This condition does not require this patient to take time off of work, and the primary goal of our Pain Management services is to improve the patient's functional capacity.     - Follow up visit: Follow up after MRI and 1st Cervical MBB.        The above plan and management options were discussed at length with patient. Patient is in agreement with the above and verbalized understanding.      Emelyn Basilio PA-C  Interventional Pain Management  [facility]        Future Appointments   Date Time Provider Department Center   3/3/2025  8:20 AM LABORATORY, [facility]   3/10/2025  9:20 AM Buddy Ortiz MD [facility] IM [facility]   3/17/2025  9:30 AM FIBROSCAN, [facility] HEPATOLOGY [facility] HEPAT [facility]   10/6/2025 12:00 PM [facility]    10/9/2025  2:00 PM Daysi Mars NP [facility] UROLOGY  [facility]   10/24/2025  1:00 PM Shazia Paz NP [facility] HEPAT [facility]

## 2025-01-29 DIAGNOSIS — M54.16 LUMBAR RADICULOPATHY, CHRONIC: ICD-10-CM

## 2025-01-29 DIAGNOSIS — M46.1 SACROILIITIS: ICD-10-CM

## 2025-01-29 DIAGNOSIS — M54.9 DORSALGIA, UNSPECIFIED: ICD-10-CM

## 2025-01-29 DIAGNOSIS — M54.32 LEFT SIDED SCIATICA: ICD-10-CM

## 2025-01-29 DIAGNOSIS — M51.369 DDD (DEGENERATIVE DISC DISEASE), LUMBAR: ICD-10-CM

## 2025-01-29 DIAGNOSIS — M47.816 LUMBAR SPONDYLOSIS: ICD-10-CM

## 2025-01-29 DIAGNOSIS — M54.16 LUMBAR RADICULOPATHY: ICD-10-CM

## 2025-01-29 RX ORDER — PREGABALIN 50 MG/1
50 CAPSULE ORAL 2 TIMES DAILY
Qty: 60 CAPSULE | Refills: 1 | Status: SHIPPED | OUTPATIENT
Start: 2025-01-29

## 2025-02-13 ENCOUNTER — HOSPITAL ENCOUNTER (OUTPATIENT)
Dept: RADIOLOGY | Facility: HOSPITAL | Age: 70
Discharge: HOME | End: 2025-02-13
Attending: INTERNAL MEDICINE
Payer: MEDICARE

## 2025-02-13 DIAGNOSIS — Z12.31 SCREENING MAMMOGRAM, ENCOUNTER FOR: ICD-10-CM

## 2025-02-13 PROCEDURE — 77063 BREAST TOMOSYNTHESIS BI: CPT | Mod: TC

## 2025-02-13 PROCEDURE — 77063 BREAST TOMOSYNTHESIS BI: CPT | Mod: 26,,, | Performed by: RADIOLOGY

## 2025-02-13 PROCEDURE — 77067 SCR MAMMO BI INCL CAD: CPT | Mod: 26,,, | Performed by: RADIOLOGY

## 2025-02-21 PROBLEM — E78.5 HYPERLIPIDEMIA LDL GOAL <130: Status: ACTIVE | Noted: 2024-03-06

## 2025-02-28 NOTE — PRE-PROCEDURE INSTRUCTIONS
Spoke with patient regarding procedure scheduled on 3.6     Arrival time 1000     Has patient been sick with fever or on antibiotics within the last 7 days? No     Does the patient have any open wounds, sores or rashes? No     Does the patient have any recent fractures? no     Has patient received a vaccination within the last 7 days? No     Received the COVID vaccination?      Has the patient stopped all medications as directed? na     Does patient have a pacemaker, defibrillator, or implantable stimulator? No     Does the patient have a ride to and from procedure and someone reliable to remain with patient?      Is the patient diabetic? no      Does the patient have sleep apnea? Or use O2 at home? no     Is the patient receiving sedation?      Is the patient instructed to remain NPO beginning at midnight the night before their procedure? yes     Procedure location confirmed with patient? Yes     Covid- Denies signs/symptoms. Instructed to notify PAT/MD if any changes.

## 2025-03-03 ENCOUNTER — LAB VISIT (OUTPATIENT)
Dept: LAB | Facility: HOSPITAL | Age: 70
End: 2025-03-03
Attending: NURSE PRACTITIONER
Payer: MEDICARE

## 2025-03-03 DIAGNOSIS — R73.03 PREDIABETES: ICD-10-CM

## 2025-03-03 DIAGNOSIS — R74.8 ELEVATED ALKALINE PHOSPHATASE LEVEL: ICD-10-CM

## 2025-03-03 DIAGNOSIS — E78.00 HYPERCHOLESTEROLEMIA: ICD-10-CM

## 2025-03-03 LAB
ALBUMIN SERPL BCP-MCNC: 3.3 G/DL (ref 3.5–5.2)
ALP SERPL-CCNC: 118 U/L (ref 40–150)
ALT SERPL W/O P-5'-P-CCNC: 32 U/L (ref 10–44)
ANION GAP SERPL CALC-SCNC: 11 MMOL/L (ref 8–16)
AST SERPL-CCNC: 72 U/L (ref 10–40)
BASOPHILS # BLD AUTO: 0.07 K/UL (ref 0–0.2)
BASOPHILS NFR BLD: 1.2 % (ref 0–1.9)
BILIRUB SERPL-MCNC: 0.4 MG/DL (ref 0.1–1)
BUN SERPL-MCNC: 13 MG/DL (ref 8–23)
CALCIUM SERPL-MCNC: 8.8 MG/DL (ref 8.7–10.5)
CHLORIDE SERPL-SCNC: 106 MMOL/L (ref 95–110)
CHOLEST SERPL-MCNC: 124 MG/DL (ref 120–199)
CHOLEST/HDLC SERPL: 3.2 {RATIO} (ref 2–5)
CK SERPL-CCNC: 150 U/L (ref 20–180)
CO2 SERPL-SCNC: 23 MMOL/L (ref 23–29)
CREAT SERPL-MCNC: 0.8 MG/DL (ref 0.5–1.4)
DIFFERENTIAL METHOD BLD: ABNORMAL
EOSINOPHIL # BLD AUTO: 0.1 K/UL (ref 0–0.5)
EOSINOPHIL NFR BLD: 2.1 % (ref 0–8)
ERYTHROCYTE [DISTWIDTH] IN BLOOD BY AUTOMATED COUNT: 19.4 % (ref 11.5–14.5)
EST. GFR  (NO RACE VARIABLE): >60 ML/MIN/1.73 M^2
ESTIMATED AVG GLUCOSE: 131 MG/DL (ref 68–131)
GGT SERPL-CCNC: 42 U/L (ref 8–55)
GLUCOSE SERPL-MCNC: 88 MG/DL (ref 70–110)
HBA1C MFR BLD: 6.2 % (ref 4–5.6)
HCT VFR BLD AUTO: 39.3 % (ref 37–48.5)
HDLC SERPL-MCNC: 39 MG/DL (ref 40–75)
HDLC SERPL: 31.5 % (ref 20–50)
HGB BLD-MCNC: 11.8 G/DL (ref 12–16)
IGG SERPL-MCNC: 1324 MG/DL (ref 650–1600)
IGM SERPL-MCNC: 58 MG/DL (ref 50–300)
IMM GRANULOCYTES # BLD AUTO: 0.01 K/UL (ref 0–0.04)
IMM GRANULOCYTES NFR BLD AUTO: 0.2 % (ref 0–0.5)
LDLC SERPL CALC-MCNC: 68.2 MG/DL (ref 63–159)
LYMPHOCYTES # BLD AUTO: 2.6 K/UL (ref 1–4.8)
LYMPHOCYTES NFR BLD: 45.3 % (ref 18–48)
MCH RBC QN AUTO: 23.7 PG (ref 27–31)
MCHC RBC AUTO-ENTMCNC: 30 G/DL (ref 32–36)
MCV RBC AUTO: 79 FL (ref 82–98)
MONOCYTES # BLD AUTO: 0.6 K/UL (ref 0.3–1)
MONOCYTES NFR BLD: 10.8 % (ref 4–15)
NEUTROPHILS # BLD AUTO: 2.3 K/UL (ref 1.8–7.7)
NEUTROPHILS NFR BLD: 40.4 % (ref 38–73)
NONHDLC SERPL-MCNC: 85 MG/DL
NRBC BLD-RTO: 0 /100 WBC
PLATELET # BLD AUTO: 400 K/UL (ref 150–450)
PMV BLD AUTO: 11.5 FL (ref 9.2–12.9)
POTASSIUM SERPL-SCNC: 3.4 MMOL/L (ref 3.5–5.1)
PROT SERPL-MCNC: 7.1 G/DL (ref 6–8.4)
RBC # BLD AUTO: 4.98 M/UL (ref 4–5.4)
SODIUM SERPL-SCNC: 140 MMOL/L (ref 136–145)
TRIGL SERPL-MCNC: 84 MG/DL (ref 30–150)
TSH SERPL DL<=0.005 MIU/L-ACNC: 1.07 UIU/ML (ref 0.4–4)
WBC # BLD AUTO: 5.63 K/UL (ref 3.9–12.7)

## 2025-03-03 PROCEDURE — 86225 DNA ANTIBODY NATIVE: CPT | Performed by: NURSE PRACTITIONER

## 2025-03-03 PROCEDURE — 86038 ANTINUCLEAR ANTIBODIES: CPT | Performed by: NURSE PRACTITIONER

## 2025-03-03 PROCEDURE — 82550 ASSAY OF CK (CPK): CPT | Performed by: NURSE PRACTITIONER

## 2025-03-03 PROCEDURE — 82784 ASSAY IGA/IGD/IGG/IGM EACH: CPT | Mod: 59 | Performed by: NURSE PRACTITIONER

## 2025-03-03 PROCEDURE — 84075 ASSAY ALKALINE PHOSPHATASE: CPT | Performed by: NURSE PRACTITIONER

## 2025-03-03 PROCEDURE — 80053 COMPREHEN METABOLIC PANEL: CPT | Performed by: NURSE PRACTITIONER

## 2025-03-03 PROCEDURE — 83036 HEMOGLOBIN GLYCOSYLATED A1C: CPT | Performed by: NURSE PRACTITIONER

## 2025-03-03 PROCEDURE — 82784 ASSAY IGA/IGD/IGG/IGM EACH: CPT | Performed by: NURSE PRACTITIONER

## 2025-03-03 PROCEDURE — 86381 MITOCHONDRIAL ANTIBODY EACH: CPT | Performed by: NURSE PRACTITIONER

## 2025-03-03 PROCEDURE — 84443 ASSAY THYROID STIM HORMONE: CPT | Performed by: NURSE PRACTITIONER

## 2025-03-03 PROCEDURE — 85025 COMPLETE CBC W/AUTO DIFF WBC: CPT | Performed by: NURSE PRACTITIONER

## 2025-03-03 PROCEDURE — 86039 ANTINUCLEAR ANTIBODIES (ANA): CPT | Performed by: NURSE PRACTITIONER

## 2025-03-03 PROCEDURE — 82977 ASSAY OF GGT: CPT | Performed by: NURSE PRACTITIONER

## 2025-03-03 PROCEDURE — 80061 LIPID PANEL: CPT | Performed by: NURSE PRACTITIONER

## 2025-03-03 PROCEDURE — 86015 ACTIN ANTIBODY EACH: CPT | Performed by: NURSE PRACTITIONER

## 2025-03-05 LAB
ANA PATTERN 1: NORMAL
ANA SER QL IF: POSITIVE
ANA TITR SER IF: NORMAL {TITER}

## 2025-03-06 ENCOUNTER — HOSPITAL ENCOUNTER (OUTPATIENT)
Facility: HOSPITAL | Age: 70
Discharge: HOME | End: 2025-03-06
Attending: ANESTHESIOLOGY | Admitting: ANESTHESIOLOGY
Payer: MEDICARE

## 2025-03-06 VITALS
WEIGHT: 192.25 LBS | BODY MASS INDEX: 32.82 KG/M2 | DIASTOLIC BLOOD PRESSURE: 64 MMHG | TEMPERATURE: 98 F | RESPIRATION RATE: 18 BRPM | HEIGHT: 64 IN | OXYGEN SATURATION: 100 % | HEART RATE: 76 BPM | SYSTOLIC BLOOD PRESSURE: 124 MMHG

## 2025-03-06 DIAGNOSIS — M47.812 CERVICAL SPONDYLOSIS: Primary | ICD-10-CM

## 2025-03-06 LAB
ACE SERPL-CCNC: 72 U/L (ref 16–85)
MITOCHONDRIA AB TITR SER IF: NORMAL {TITER}
SMOOTH MUSCLE AB TITR SER IF: NORMAL {TITER}

## 2025-03-06 PROCEDURE — 64490 INJ PARAVERT F JNT C/T 1 LEV: CPT | Mod: 50 | Performed by: ANESTHESIOLOGY

## 2025-03-06 PROCEDURE — 63600175 PHARM REV CODE 636 W HCPCS: Performed by: ANESTHESIOLOGY

## 2025-03-06 PROCEDURE — 64490 INJ PARAVERT F JNT C/T 1 LEV: CPT | Mod: 50,,, | Performed by: ANESTHESIOLOGY

## 2025-03-06 PROCEDURE — 64491 INJ PARAVERT F JNT C/T 2 LEV: CPT | Mod: 50 | Performed by: ANESTHESIOLOGY

## 2025-03-06 PROCEDURE — 64491 INJ PARAVERT F JNT C/T 2 LEV: CPT | Mod: 50,,, | Performed by: ANESTHESIOLOGY

## 2025-03-06 RX ORDER — ONDANSETRON HYDROCHLORIDE 2 MG/ML
4 INJECTION, SOLUTION INTRAVENOUS ONCE AS NEEDED
Status: DISCONTINUED | OUTPATIENT
Start: 2025-03-06 | End: 2025-03-06 | Stop reason: HOSPADM

## 2025-03-06 RX ORDER — BUPIVACAINE HYDROCHLORIDE 5 MG/ML
INJECTION, SOLUTION EPIDURAL; INTRACAUDAL; PERINEURAL
Status: DISCONTINUED | OUTPATIENT
Start: 2025-03-06 | End: 2025-03-06 | Stop reason: HOSPADM

## 2025-03-06 RX ORDER — FENTANYL CITRATE 50 UG/ML
INJECTION, SOLUTION INTRAMUSCULAR; INTRAVENOUS
Status: DISCONTINUED | OUTPATIENT
Start: 2025-03-06 | End: 2025-03-06 | Stop reason: HOSPADM

## 2025-03-06 RX ORDER — MIDAZOLAM HYDROCHLORIDE 1 MG/ML
INJECTION, SOLUTION INTRAMUSCULAR; INTRAVENOUS
Status: DISCONTINUED | OUTPATIENT
Start: 2025-03-06 | End: 2025-03-06 | Stop reason: HOSPADM

## 2025-03-06 NOTE — H&P
"HPI  Patient presenting for Procedure(s) (LRB):  bilateral Cervical C4-6 medial branch block (Bilateral)     Patient on Anti-coagulation No    No health changes since previous encounter    Past Medical History:   Diagnosis Date    Hypertension      Past Surgical History:   Procedure Laterality Date    COLONOSCOPY N/A 03/27/2024    Procedure: COLONOSCOPY;  Surgeon: Tay Mercado MD;  Location: [facility];  Service: General;  Laterality: N/A;    HYSTERECTOMY      JOINT REPLACEMENT      OOPHORECTOMY Bilateral      Review of patient's allergies indicates:   Allergen Reactions    Shellfish containing products Rash and Swelling     Other Reaction(s): Not available    Iodine      Other Reaction(s): Not available    Tuberculin         Medications Ordered Prior to Encounter[1]     PMHx, PSHx, Allergies, Medications reviewed in epic    ROS negative except pain complaints in HPI    OBJECTIVE:    BP (!) 144/80 (BP Location: Right arm, Patient Position: Sitting)   Pulse 81   Temp 97.7 °F (36.5 °C) (Temporal)   Resp 16   Ht 5' 4" (1.626 m)   Wt 87.2 kg (192 lb 3.9 oz)   LMP  (LMP Unknown)   SpO2 96%   Breastfeeding No   BMI 33.00 kg/m²     PHYSICAL EXAMINATION:    GENERAL: Well appearing, in no acute distress, alert and oriented x3.  PSYCH:  Mood and affect appropriate.  SKIN: Skin color, texture, turgor normal, no rashes or lesions which will impact the procedure.  CV: RRR with palpation of the radial artery.  PULM: No evidence of respiratory difficulty, symmetric chest rise. Clear to auscultation.  NEURO: Cranial nerves grossly intact.    Plan:    Proceed with procedure as planned Procedure(s) (LRB):  bilateral Cervical C4-6 medial branch block (Bilateral)    Rony Rodríguez MD  03/06/2025                 [1]   No current facility-administered medications on file prior to encounter.     Current Outpatient Medications on File Prior to Encounter   Medication Sig Dispense Refill    cetirizine (ZYRTEC) 10 MG tablet " Take 1 tablet (10 mg total) by mouth once daily. 90 tablet 3    cholecalciferol, vitamin D3, (VITAMIN D3) 50 mcg (2,000 unit) Tab Take 2,000 Units by mouth once daily.      cyanocobalamin (VITAMIN B-12) 1000 MCG tablet Take 1 tablet by mouth once daily.      hydroCHLOROthiazide (HYDRODIURIL) 50 MG tablet Take 0.5 tablets (25 mg total) by mouth once daily. 90 tablet 3    meloxicam (MOBIC) 15 MG tablet Take 1 tablet (15 mg total) by mouth once daily. 30 tablet 11    methocarbamoL (ROBAXIN) 500 MG Tab Take 1 tablet (500 mg total) by mouth 2 (two) times daily as needed (muscle spasms). May cause drowsiness. 60 tablet 2    oxybutynin (DITROPAN-XL) 5 MG TR24 Take 2.5 mg by mouth once daily.      atorvastatin (LIPITOR) 40 MG tablet Take 1 tablet (40 mg total) by mouth once daily. 90 tablet 3    diclofenac sodium (SOLARAZE) 3 % gel Apply up to 4 times a day. 200 g 11    EPINEPHrine (EPIPEN JR) 0.15 mg/0.3 mL pen injection Inject 0.3 mLs (0.15 mg total) into the muscle as needed for Anaphylaxis. 1 each 11

## 2025-03-06 NOTE — PLAN OF CARE
Pt discharged home, awake, alert, oriented x's 4, denies any pain, no apparent distress noted. PIV removed, catheter intact. All questions and concerns addressed and answered, pt verbalizes understanding of discharge process, VSS on RA and is being discharged to car via wheelchair.

## 2025-03-06 NOTE — DISCHARGE INSTRUCTIONS

## 2025-03-06 NOTE — DISCHARGE SUMMARY
Discharge Note  Short Stay      SUMMARY     Admit Date: 3/6/2025    Attending Physician: Rony Rodríguez MD        Discharge Physician: Rony Rodríguez MD        Discharge Date: 3/6/2025 11:18 AM    Procedure(s) (LRB):  bilateral Cervical C4-6 medial branch block (Bilateral)    Final Diagnosis: Cervical spondylosis [M47.812]    Disposition: Home or self care    Patient Instructions:   Current Discharge Medication List        CONTINUE these medications which have NOT CHANGED    Details   cetirizine (ZYRTEC) 10 MG tablet Take 1 tablet (10 mg total) by mouth once daily.  Qty: 90 tablet, Refills: 3      cholecalciferol, vitamin D3, (VITAMIN D3) 50 mcg (2,000 unit) Tab Take 2,000 Units by mouth once daily.      cyanocobalamin (VITAMIN B-12) 1000 MCG tablet Take 1 tablet by mouth once daily.      hydroCHLOROthiazide (HYDRODIURIL) 50 MG tablet Take 0.5 tablets (25 mg total) by mouth once daily.  Qty: 90 tablet, Refills: 3    Comments: .      meloxicam (MOBIC) 15 MG tablet Take 1 tablet (15 mg total) by mouth once daily.  Qty: 30 tablet, Refills: 11      methocarbamoL (ROBAXIN) 500 MG Tab Take 1 tablet (500 mg total) by mouth 2 (two) times daily as needed (muscle spasms). May cause drowsiness.  Qty: 60 tablet, Refills: 2    Associated Diagnoses: Pelvic and perineal pain; Sacral insufficiency fracture, initial encounter      oxybutynin (DITROPAN-XL) 5 MG TR24 Take 2.5 mg by mouth once daily.      pregabalin (LYRICA) 50 MG capsule TAKE 1 CAPSULE BY MOUTH 2 TIMES DAILY.  Qty: 60 capsule, Refills: 1    Comments: Not to exceed 4 additional fills before 03/17/2025 DX Code Needed  .  Associated Diagnoses: Left sided sciatica; Lumbar radiculopathy; Sacroiliitis; Lumbar spondylosis; DDD (degenerative disc disease), lumbar; Dorsalgia, unspecified; Lumbar radiculopathy, chronic      atorvastatin (LIPITOR) 40 MG tablet Take 1 tablet (40 mg total) by mouth once daily.  Qty: 90 tablet, Refills: 3      diclofenac sodium (SOLARAZE) 3 %  gel Apply up to 4 times a day.  Qty: 200 g, Refills: 11      EPINEPHrine (EPIPEN JR) 0.15 mg/0.3 mL pen injection Inject 0.3 mLs (0.15 mg total) into the muscle as needed for Anaphylaxis.  Qty: 1 each, Refills: 11                 Discharge Diagnosis: Cervical spondylosis [M47.812]  Condition on Discharge: Stable with no complications to procedure   Diet on Discharge: Same as before.  Activity: as per instruction sheet.  Discharge to: Home with a responsible adult.  Follow up: 2-4 weeks       Please call the office at [phone] if you experience any weakness or loss of sensation, fever > 101.5, pain uncontrolled with oral medications, persistent nausea/vomiting/or diarrhea, redness or drainage from the incisions, or any other worrisome concerns. If physician on call was not reached or could not communicate with our office for any reason please go to the nearest emergency department

## 2025-03-06 NOTE — OP NOTE
CERVICAL Medial Branch Block Under Fluoroscopy  Bilateral  C4/C5 and C5/C6    INFORMED CONSENT: The procedure, risks, benefits and options were discussed with patient. There are no contraindications to the procedure. The patient expressed understanding and agreed to proceed. The personnel performing the procedure was discussed.    Date of procedure 03/06/2025    Time-out taken to identify patient and procedure side prior to starting the procedure.                                                                     PROCEDURE:  Bilateral  medial branch block at the transverse processes of  C4, C5, and C6    Pre Procedure diagnosis:    Cervical spondylosis [M47.812]  1. Cervical spondylosis        Post-Procedure diagnosis:   same    PHYSICIAN: Rony Rodríguez MD    ASSISTANTS: None    MEDICATIONS INJECTED:  1ml 0.5% Bupivicaine PF, at each level  LOCAL ANESTHETIC USED:   1ml Lidocaine PF, at each level    ESTIMATED BLOOD LOSS:  None.    COMPLICATIONS:  None.    Sedation: Conscious sedation provided by M.OG    SEDATION MEDICATIONS: local/IV sedation: Versed 1 mg and fentanyl 25 mcg IV.  Conscious sedation ordered by MD.  Patient reevaluated and sedation administered by MD and monitored by RN.  Total sedation time was less than 10 min.    Total sedation time was <10 min      TECHNIQUE:   Laying in a prone position, the patient was prepped and draped in the usual sterile fashion using ChloraPrep and fenestrated drape.  The level was determined under fluoroscopic guidance.  Local anesthetic was given by going down to the hub of the 27-gauge 1.25in needle and raising a wheel.  A 25-gauge 3.5inch needle was introduced to the anatomic local of the medial branch at each of the stated above levels using fluoroscopy. Then after negative aspiration, the medication was injected slowly. The patient tolerated the procedure well.       The patient was monitored for approximately 30 minutes after the procedure.  Patient was given  post procedure and discharge instructions to follow at home.  The patient was discharged in a stable condition

## 2025-03-07 ENCOUNTER — TELEPHONE (OUTPATIENT)
Dept: PAIN MEDICINE | Facility: CLINIC | Age: 70
End: 2025-03-07
Payer: MEDICARE

## 2025-03-07 DIAGNOSIS — M47.812 CERVICAL SPONDYLOSIS: Primary | ICD-10-CM

## 2025-03-07 LAB
ANTI SM ANTIBODY: 0.14 RATIO (ref 0–0.99)
ANTI SM/RNP ANTIBODY: 0.13 RATIO (ref 0–0.99)
ANTI-SM INTERPRETATION: NEGATIVE
ANTI-SM/RNP INTERPRETATION: NEGATIVE
ANTI-SSA ANTIBODY: 0.1 RATIO (ref 0–0.99)
ANTI-SSA INTERPRETATION: NEGATIVE
ANTI-SSB ANTIBODY: 0.11 RATIO (ref 0–0.99)
ANTI-SSB INTERPRETATION: NEGATIVE
DSDNA AB SER-ACNC: NORMAL [IU]/ML

## 2025-03-07 NOTE — TELEPHONE ENCOUNTER
Regarding recent procedure: Cervical MBB #1 on 3/6/25    1. How much better did you feel day of procedure (0-6 hours immediately after)? 100% = complete relief of pain; 0% = no pain relief at all.   100    2. What was pain score the day before your procedure on a scale from 0-10?  7    3. What was pain score immediately after your procedure (up to 6 hours) on a scale from 0-10?  0    4. When your pain returned, what was your pain score on a scale from 0-10?  2         Pain Disability Index (PDI) Score Review:      9/25/2024     7:47 AM 9/18/2024    12:10 PM   Last 3 PDI Scores   Pain Disability Index (PDI) 54 63

## 2025-03-07 NOTE — TELEPHONE ENCOUNTER
Patient reports 100% relief for proximally 10 hours after bilateral C4-C6 medial branch block on 03/06/2025.  We will proceed with 2nd diagnostic medial branch block.

## 2025-03-10 ENCOUNTER — PATIENT MESSAGE (OUTPATIENT)
Dept: HEPATOLOGY | Facility: CLINIC | Age: 70
End: 2025-03-10
Payer: MEDICARE

## 2025-03-12 ENCOUNTER — OFFICE VISIT (OUTPATIENT)
Dept: INTERNAL MEDICINE | Facility: CLINIC | Age: 70
End: 2025-03-12
Payer: MEDICARE

## 2025-03-12 VITALS
BODY MASS INDEX: 33.05 KG/M2 | TEMPERATURE: 98 F | HEART RATE: 71 BPM | SYSTOLIC BLOOD PRESSURE: 110 MMHG | HEIGHT: 64 IN | OXYGEN SATURATION: 97 % | WEIGHT: 193.56 LBS | DIASTOLIC BLOOD PRESSURE: 74 MMHG

## 2025-03-12 DIAGNOSIS — R73.03 PRE-DIABETES: ICD-10-CM

## 2025-03-12 DIAGNOSIS — E66.811 CLASS 1 OBESITY DUE TO EXCESS CALORIES WITH SERIOUS COMORBIDITY AND BODY MASS INDEX (BMI) OF 32.0 TO 32.9 IN ADULT: ICD-10-CM

## 2025-03-12 DIAGNOSIS — M15.0 PRIMARY OSTEOARTHRITIS INVOLVING MULTIPLE JOINTS: ICD-10-CM

## 2025-03-12 DIAGNOSIS — E53.9 VITAMIN B DEFICIENCY: ICD-10-CM

## 2025-03-12 DIAGNOSIS — E78.5 HYPERLIPIDEMIA LDL GOAL <130: ICD-10-CM

## 2025-03-12 DIAGNOSIS — E66.09 CLASS 1 OBESITY DUE TO EXCESS CALORIES WITH SERIOUS COMORBIDITY AND BODY MASS INDEX (BMI) OF 32.0 TO 32.9 IN ADULT: ICD-10-CM

## 2025-03-12 DIAGNOSIS — I10 ESSENTIAL (PRIMARY) HYPERTENSION: ICD-10-CM

## 2025-03-12 DIAGNOSIS — K76.0 METABOLIC DYSFUNCTION-ASSOCIATED STEATOTIC LIVER DISEASE (MASLD): ICD-10-CM

## 2025-03-12 DIAGNOSIS — Z91.013 SHELLFISH ALLERGY: ICD-10-CM

## 2025-03-12 DIAGNOSIS — Z00.00 WELL ADULT EXAM: Primary | ICD-10-CM

## 2025-03-12 PROCEDURE — 99214 OFFICE O/P EST MOD 30 MIN: CPT | Mod: PBBFAC | Performed by: PEDIATRICS

## 2025-03-12 PROCEDURE — 99999 PR PBB SHADOW E&M-EST. PATIENT-LVL IV: CPT | Mod: PBBFAC,,, | Performed by: PEDIATRICS

## 2025-03-12 NOTE — PROGRESS NOTES
Subjective:       Patient ID: Pamela Lancaster is a 69 y.o. female.    Chief Complaint: Follow-up    Pamela Lancaster is a 68 year old female for annual.  [facility]     PMHx, PSHx, SocHx, and FHx reviewed and discussed with patient.     1) LIPIDS: on lipitor, trying to follow D&E  2) Fluid retention in lower extremities s/p knee replacement: on HCTZ  3) Shellfish Allergy: has epipen  4) GYN: [facility]  5) Osteoarthritis: neck/shoulder pain  6) Obesity: s/p bariatric surgery, had diagnosis of HTN with weight loss is off lisinopril  7)elevated LFTs: followed by GI.  8)preDM: not following low carb. No hyperglycemic symptoms. A1c 6.2     PSHx: Hysterectomy, gastric bypass, bilateral knee replacement, shoulder replacement, splenectomy, cataracts      Follow-up  Associated symptoms include arthralgias. Pertinent negatives include no abdominal pain, congestion, coughing, fever, headaches, joint swelling, rash or vomiting.   Review of Systems   Constitutional:  Negative for fever and unexpected weight change.   HENT:  Negative for congestion and rhinorrhea.    Eyes:  Negative for discharge and redness.   Respiratory:  Negative for cough and wheezing.    Gastrointestinal:  Negative for abdominal pain, constipation, diarrhea and vomiting.   Endocrine: Negative for cold intolerance, heat intolerance, polydipsia, polyphagia and polyuria.   Genitourinary:  Negative for decreased urine volume, difficulty urinating and menstrual problem.   Musculoskeletal:  Positive for arthralgias and back pain. Negative for joint swelling.        Much improved with treatment   Skin:  Negative for rash and wound.   Neurological:  Negative for syncope and headaches.   Psychiatric/Behavioral:  Negative for behavioral problems and sleep disturbance.        Objective:      Physical Exam  Vitals and nursing note reviewed.   Constitutional:       General: She is not in acute distress.     Appearance: She is well-developed.    Neck:      Thyroid: No thyromegaly.      Vascular: No JVD.   Cardiovascular:      Rate and Rhythm: Normal rate and regular rhythm.      Heart sounds: Normal heart sounds. No murmur heard.  Pulmonary:      Effort: Pulmonary effort is normal. No respiratory distress.      Breath sounds: Normal breath sounds. No wheezing or rales.   Abdominal:      General: There is no distension.      Palpations: Abdomen is soft. There is no mass.      Tenderness: There is no abdominal tenderness. There is no guarding.   Musculoskeletal:      Right lower leg: No edema.      Left lower leg: No edema.   Lymphadenopathy:      Cervical: No cervical adenopathy.   Skin:     Capillary Refill: Capillary refill takes less than 2 seconds.      Findings: No rash.   Neurological:      General: No focal deficit present.      Mental Status: She is alert and oriented to person, place, and time.      Cranial Nerves: No cranial nerve deficit.      Coordination: Coordination normal.   Psychiatric:         Mood and Affect: Mood normal.         Behavior: Behavior normal.         Thought Content: Thought content normal.         Judgment: Judgment normal.         Assessment:       1. Well adult exam    2. Pre-diabetes    3. Primary osteoarthritis involving multiple joints    4. Shellfish allergy    5. Vitamin B deficiency    6. Essential (primary) hypertension    7. Metabolic dysfunction-associated steatotic liver disease (MASLD)    8. Class 1 obesity due to excess calories with serious comorbidity and body mass index (BMI) of 32.0 to 32.9 in adult    9. Hyperlipidemia LDL goal <130        Plan:       Well adult exam    Pre-diabetes  -     Hemoglobin A1C; Future; Expected date: 03/12/2025    Primary osteoarthritis involving multiple joints    Shellfish allergy    Vitamin B deficiency  -     CBC Auto Differential; Future; Expected date: 03/12/2025    Essential (primary) hypertension  -     Comprehensive Metabolic Panel; Future; Expected date:  03/12/2025  -     Lipid Panel; Future; Expected date: 03/12/2025    Metabolic dysfunction-associated steatotic liver disease (MASLD)    Class 1 obesity due to excess calories with serious comorbidity and body mass index (BMI) of 32.0 to 32.9 in adult    Hyperlipidemia LDL goal <130    HMI d/w pt. Low card diet recommended. At goal for B/P, lipids, and A1c. Maintain meds, self monitoring D&E, weight moderation. F/U 6 months with labs.

## 2025-03-13 ENCOUNTER — TELEPHONE (OUTPATIENT)
Dept: HEPATOLOGY | Facility: CLINIC | Age: 70
End: 2025-03-13
Payer: MEDICARE

## 2025-03-13 NOTE — TELEPHONE ENCOUNTER
Can you please contact pt to schedule fibroscan in [place] before her oct f/u appt with me? Thanks !

## 2025-04-02 NOTE — PRE-PROCEDURE INSTRUCTIONS
Spoke with patient regarding procedure scheduled on 4/7     Arrival time 0945     Has patient been sick with fever or on antibiotics within the last 7 days? No     Does the patient have any open wounds, sores or rashes? No     Does the patient have any recent fractures? no     Has patient received a vaccination within the last 7 days? No     Received the COVID vaccination?      Has the patient stopped all medications as directed? na     Does patient have a pacemaker, defibrillator, or implantable stimulator? No     Does the patient have a ride to and from procedure and someone reliable to remain with patient?      Is the patient diabetic? no      Does the patient have sleep apnea? Or use O2 at home? no     Is the patient receiving sedation?      Is the patient instructed to remain NPO beginning at midnight the night before their procedure? yes     Procedure location confirmed with patient? Yes     Covid- Denies signs/symptoms. Instructed to notify PAT/MD if any changes.

## 2025-04-03 ENCOUNTER — APPOINTMENT (OUTPATIENT)
Dept: RADIOLOGY | Facility: HOSPITAL | Age: 70
End: 2025-04-03
Attending: INTERNAL MEDICINE

## 2025-04-03 DIAGNOSIS — Z13.820 ENCOUNTER FOR SCREENING FOR OSTEOPOROSIS: ICD-10-CM

## 2025-04-03 PROCEDURE — 77080 DXA BONE DENSITY AXIAL: CPT | Mod: TC

## 2025-04-14 ENCOUNTER — HOSPITAL ENCOUNTER (OUTPATIENT)
Facility: HOSPITAL | Age: 70
Discharge: HOME | End: 2025-04-14
Attending: ANESTHESIOLOGY | Admitting: ANESTHESIOLOGY
Payer: MEDICARE

## 2025-04-14 VITALS
TEMPERATURE: 98 F | WEIGHT: 188.19 LBS | DIASTOLIC BLOOD PRESSURE: 63 MMHG | RESPIRATION RATE: 16 BRPM | HEIGHT: 64 IN | BODY MASS INDEX: 32.13 KG/M2 | OXYGEN SATURATION: 100 % | HEART RATE: 68 BPM | SYSTOLIC BLOOD PRESSURE: 133 MMHG

## 2025-04-14 DIAGNOSIS — M47.812 CERVICAL SPONDYLOSIS: Primary | ICD-10-CM

## 2025-04-14 PROCEDURE — 64490 INJ PARAVERT F JNT C/T 1 LEV: CPT | Mod: 50 | Performed by: ANESTHESIOLOGY

## 2025-04-14 PROCEDURE — 64491 INJ PARAVERT F JNT C/T 2 LEV: CPT | Mod: 50 | Performed by: ANESTHESIOLOGY

## 2025-04-14 PROCEDURE — 64491 INJ PARAVERT F JNT C/T 2 LEV: CPT | Mod: 50,,, | Performed by: ANESTHESIOLOGY

## 2025-04-14 PROCEDURE — 64490 INJ PARAVERT F JNT C/T 1 LEV: CPT | Mod: 50,,, | Performed by: ANESTHESIOLOGY

## 2025-04-14 PROCEDURE — 63600175 PHARM REV CODE 636 W HCPCS: Performed by: ANESTHESIOLOGY

## 2025-04-14 RX ORDER — BUPIVACAINE HYDROCHLORIDE 5 MG/ML
INJECTION, SOLUTION EPIDURAL; INTRACAUDAL; PERINEURAL
Status: DISCONTINUED | OUTPATIENT
Start: 2025-04-14 | End: 2025-04-14 | Stop reason: HOSPADM

## 2025-04-14 RX ORDER — ONDANSETRON HYDROCHLORIDE 2 MG/ML
4 INJECTION, SOLUTION INTRAVENOUS ONCE AS NEEDED
Status: DISCONTINUED | OUTPATIENT
Start: 2025-04-14 | End: 2025-04-14 | Stop reason: HOSPADM

## 2025-04-14 RX ORDER — FENTANYL CITRATE 50 UG/ML
INJECTION, SOLUTION INTRAMUSCULAR; INTRAVENOUS
Status: DISCONTINUED | OUTPATIENT
Start: 2025-04-14 | End: 2025-04-14 | Stop reason: HOSPADM

## 2025-04-14 RX ORDER — MIDAZOLAM HYDROCHLORIDE 1 MG/ML
INJECTION, SOLUTION INTRAMUSCULAR; INTRAVENOUS
Status: DISCONTINUED | OUTPATIENT
Start: 2025-04-14 | End: 2025-04-14 | Stop reason: HOSPADM

## 2025-04-14 RX ORDER — ONDANSETRON HYDROCHLORIDE 2 MG/ML
4 INJECTION, SOLUTION INTRAVENOUS ONCE AS NEEDED
Status: DISCONTINUED | OUTPATIENT
Start: 2025-04-14 | End: 2025-04-14 | Stop reason: SDUPTHER

## 2025-04-14 NOTE — OP NOTE
CERVICAL Medial Branch Block Under Fluoroscopy  Bilateral  C4/C5 and C5/C6    INFORMED CONSENT: The procedure, risks, benefits and options were discussed with patient. There are no contraindications to the procedure. The patient expressed understanding and agreed to proceed. The personnel performing the procedure was discussed.    Date of procedure 04/14/2025    Time-out taken to identify patient and procedure side prior to starting the procedure.                                                                     PROCEDURE:  Bilateral  medial branch block at the transverse processes of  C4, C5, and C6    Pre Procedure diagnosis:    Cervical spondylosis [M47.812]  1. Cervical spondylosis        Post-Procedure diagnosis:   same    PHYSICIAN: Rony Rodríguez MD    ASSISTANTS: None    MEDICATIONS INJECTED:  1ml 0.5% Bupivicaine PF, at each level  LOCAL ANESTHETIC USED:   1ml Lidocaine PF, at each level    ESTIMATED BLOOD LOSS:  None.    COMPLICATIONS:  None.    Sedation: Conscious sedation provided by M.OG    SEDATION MEDICATIONS: local/IV sedation: Versed 1 mg and fentanyl 25 mcg IV.  Conscious sedation ordered by MD.  Patient reevaluated and sedation administered by MD and monitored by RN.  Total sedation time was less than 10 min.    Total sedation time was <10 min      TECHNIQUE:   Laying in a prone position, the patient was prepped and draped in the usual sterile fashion using ChloraPrep and fenestrated drape.  The level was determined under fluoroscopic guidance.  Local anesthetic was given by going down to the hub of the 27-gauge 1.25in needle and raising a wheel.  A 25-gauge 3.5inch needle was introduced to the anatomic local of the medial branch at each of the stated above levels using fluoroscopy. Then after negative aspiration, the medication was injected slowly. The patient tolerated the procedure well.       The patient was monitored for approximately 30 minutes after the procedure.  Patient was given  post procedure and discharge instructions to follow at home.  The patient was discharged in a stable condition

## 2025-04-14 NOTE — DISCHARGE SUMMARY
Discharge Note  Short Stay      SUMMARY     Admit Date: 4/14/2025    Attending Physician: Rony Rodríguez MD        Discharge Physician: Rony Rodríguez MD        Discharge Date: 4/14/2025 10:47 AM    Procedure(s) (LRB):  Bilateral C4-C6 MBB with RN IV sedation (Bilateral)    Final Diagnosis: Cervical spondylosis [M47.812]    Disposition: Home or self care    Patient Instructions:   Current Discharge Medication List        CONTINUE these medications which have NOT CHANGED    Details   atorvastatin (LIPITOR) 40 MG tablet Take 1 tablet (40 mg total) by mouth once daily.  Qty: 90 tablet, Refills: 3      cetirizine (ZYRTEC) 10 MG tablet Take 1 tablet (10 mg total) by mouth once daily.  Qty: 90 tablet, Refills: 3      cholecalciferol, vitamin D3, (VITAMIN D3) 50 mcg (2,000 unit) Tab Take 2,000 Units by mouth once daily.      cyanocobalamin (VITAMIN B-12) 1000 MCG tablet Take 1 tablet by mouth once daily.      diclofenac sodium (SOLARAZE) 3 % gel Apply up to 4 times a day.  Qty: 200 g, Refills: 11      EPINEPHrine (EPIPEN JR) 0.15 mg/0.3 mL pen injection Inject 0.3 mLs (0.15 mg total) into the muscle as needed for Anaphylaxis.  Qty: 1 each, Refills: 11      hydroCHLOROthiazide (HYDRODIURIL) 50 MG tablet Take 0.5 tablets (25 mg total) by mouth once daily.  Qty: 90 tablet, Refills: 3    Comments: .      meloxicam (MOBIC) 15 MG tablet Take 1 tablet (15 mg total) by mouth once daily.  Qty: 30 tablet, Refills: 11      methocarbamoL (ROBAXIN) 500 MG Tab Take 1 tablet (500 mg total) by mouth 2 (two) times daily as needed (muscle spasms). May cause drowsiness.  Qty: 60 tablet, Refills: 2    Associated Diagnoses: Pelvic and perineal pain; Sacral insufficiency fracture, initial encounter      oxybutynin (DITROPAN-XL) 5 MG TR24 Take 2.5 mg by mouth once daily.      pregabalin (LYRICA) 50 MG capsule TAKE 1 CAPSULE BY MOUTH 2 TIMES DAILY.  Qty: 60 capsule, Refills: 1    Comments: Not to exceed 4 additional fills before 03/17/2025  DX Code Needed  .  Associated Diagnoses: Left sided sciatica; Lumbar radiculopathy; Sacroiliitis; Lumbar spondylosis; DDD (degenerative disc disease), lumbar; Dorsalgia, unspecified; Lumbar radiculopathy, chronic                 Discharge Diagnosis: Cervical spondylosis [M47.812]  Condition on Discharge: Stable with no complications to procedure   Diet on Discharge: Same as before.  Activity: as per instruction sheet.  Discharge to: Home with a responsible adult.  Follow up: 2-4 weeks       Please call the office at [phone] if you experience any weakness or loss of sensation, fever > 101.5, pain uncontrolled with oral medications, persistent nausea/vomiting/or diarrhea, redness or drainage from the incisions, or any other worrisome concerns. If physician on call was not reached or could not communicate with our office for any reason please go to the nearest emergency department

## 2025-04-14 NOTE — H&P
"HPI  Patient presenting for Procedure(s) (LRB):  Bilateral C4-C6 MBB with RN IV sedation (Bilateral)     Patient on Anti-coagulation No    No health changes since previous encounter    Past Medical History:   Diagnosis Date    Hypertension      Past Surgical History:   Procedure Laterality Date    COLONOSCOPY N/A 03/27/2024    Procedure: COLONOSCOPY;  Surgeon: Tay Mercado MD;  Location: [facility];  Service: General;  Laterality: N/A;    HYSTERECTOMY      INJECTION OF ANESTHETIC AGENT AROUND MEDIAL BRANCH NERVES INNERVATING CERVICAL FACET JOINT Bilateral 3/6/2025    Procedure: bilateral Cervical C4-6 medial branch block;  Surgeon: Rony Rodríguez MD;  Location: [facility];  Service: Pain Management;  Laterality: Bilateral;    JOINT REPLACEMENT      OOPHORECTOMY Bilateral      Review of patient's allergies indicates:   Allergen Reactions    Shellfish containing products Rash and Swelling     Other Reaction(s): Not available    Iodine      Other Reaction(s): Not available    Tuberculin         Medications Ordered Prior to Encounter[1]     PMHx, PSHx, Allergies, Medications reviewed in epic    ROS negative except pain complaints in HPI    OBJECTIVE:    /73 (BP Location: Right arm, Patient Position: Sitting)   Pulse 67   Temp 97.7 °F (36.5 °C) (Temporal)   Resp 16   Ht 5' 4" (1.626 m)   Wt 85.3 kg (188 lb 2.6 oz)   LMP  (LMP Unknown)   SpO2 98%   Breastfeeding No   BMI 32.30 kg/m²     PHYSICAL EXAMINATION:    GENERAL: Well appearing, in no acute distress, alert and oriented x3.  PSYCH:  Mood and affect appropriate.  SKIN: Skin color, texture, turgor normal, no rashes or lesions which will impact the procedure.  CV: RRR with palpation of the radial artery.  PULM: No evidence of respiratory difficulty, symmetric chest rise. Clear to auscultation.  NEURO: Cranial nerves grossly intact.    Plan:    Proceed with procedure as planned Procedure(s) (LRB):  Bilateral C4-C6 MBB with RN IV sedation " (Bilateral)    Rony Rodríguez MD  04/14/2025                 [1]   No current facility-administered medications on file prior to encounter.     Current Outpatient Medications on File Prior to Encounter   Medication Sig Dispense Refill    atorvastatin (LIPITOR) 40 MG tablet Take 1 tablet (40 mg total) by mouth once daily. 90 tablet 3    cetirizine (ZYRTEC) 10 MG tablet Take 1 tablet (10 mg total) by mouth once daily. 90 tablet 3    cholecalciferol, vitamin D3, (VITAMIN D3) 50 mcg (2,000 unit) Tab Take 2,000 Units by mouth once daily.      cyanocobalamin (VITAMIN B-12) 1000 MCG tablet Take 1 tablet by mouth once daily.      diclofenac sodium (SOLARAZE) 3 % gel Apply up to 4 times a day. 200 g 11    EPINEPHrine (EPIPEN JR) 0.15 mg/0.3 mL pen injection Inject 0.3 mLs (0.15 mg total) into the muscle as needed for Anaphylaxis. 1 each 11    hydroCHLOROthiazide (HYDRODIURIL) 50 MG tablet Take 0.5 tablets (25 mg total) by mouth once daily. 90 tablet 3    meloxicam (MOBIC) 15 MG tablet Take 1 tablet (15 mg total) by mouth once daily. 30 tablet 11    methocarbamoL (ROBAXIN) 500 MG Tab Take 1 tablet (500 mg total) by mouth 2 (two) times daily as needed (muscle spasms). May cause drowsiness. 60 tablet 2    oxybutynin (DITROPAN-XL) 5 MG TR24 Take 2.5 mg by mouth once daily.      pregabalin (LYRICA) 50 MG capsule TAKE 1 CAPSULE BY MOUTH 2 TIMES DAILY. 60 capsule 1

## 2025-04-14 NOTE — DISCHARGE INSTRUCTIONS

## 2025-04-15 ENCOUNTER — TELEPHONE (OUTPATIENT)
Dept: PAIN MEDICINE | Facility: CLINIC | Age: 70
End: 2025-04-15
Payer: MEDICARE

## 2025-04-15 NOTE — TELEPHONE ENCOUNTER
Patient reports 90% relief for 10 hours after bilateral C4-C6 medial branch block on 04/14/2025.  This is patient's 2nd positive medial branch block.  We will proceed with RFA.

## 2025-05-13 ENCOUNTER — PATIENT MESSAGE (OUTPATIENT)
Dept: PAIN MEDICINE | Facility: HOSPITAL | Age: 70
End: 2025-05-13
Payer: OTHER GOVERNMENT

## 2025-05-13 NOTE — PRE-PROCEDURE INSTRUCTIONS
Unable to reach patient regarding procedure scheduled on 5.19. PAT instructions and arrival time sent via [employer]     Arrival time 0900     Has patient been sick with fever or on antibiotics within the last 7 days? No     Does the patient have any open wounds, sores or rashes? No     Does the patient have any recent fractures? no     Has patient received a vaccination within the last 7 days? No     Received the COVID vaccination? yes     Has the patient stopped all medications as directed? na     Does patient have a pacemaker, defibrillator, or implantable stimulator? No     Does the patient have a ride to and from procedure and someone reliable to remain with patient?       Is the patient diabetic? no      Does the patient have sleep apnea? Or use O2 at home? no     Is the patient receiving sedation? Yes      Is the patient instructed to remain NPO beginning at midnight the night before their procedure? yes     Procedure location confirmed with patient? Yes     Covid- Denies signs/symptoms. Instructed to notify PAT/MD if any changes.

## 2025-05-15 ENCOUNTER — PATIENT MESSAGE (OUTPATIENT)
Dept: PAIN MEDICINE | Facility: HOSPITAL | Age: 70
End: 2025-05-15
Payer: OTHER GOVERNMENT

## 2025-05-19 ENCOUNTER — HOSPITAL ENCOUNTER (OUTPATIENT)
Facility: HOSPITAL | Age: 70
Discharge: HOME | End: 2025-05-19
Attending: ANESTHESIOLOGY | Admitting: ANESTHESIOLOGY
Payer: MEDICARE

## 2025-05-19 VITALS
BODY MASS INDEX: 32.06 KG/M2 | HEIGHT: 64 IN | OXYGEN SATURATION: 98 % | HEART RATE: 77 BPM | WEIGHT: 187.81 LBS | RESPIRATION RATE: 16 BRPM | DIASTOLIC BLOOD PRESSURE: 84 MMHG | SYSTOLIC BLOOD PRESSURE: 148 MMHG | TEMPERATURE: 98 F

## 2025-05-19 DIAGNOSIS — K76.0 METABOLIC DYSFUNCTION-ASSOCIATED STEATOTIC LIVER DISEASE (MASLD): ICD-10-CM

## 2025-05-19 DIAGNOSIS — M47.812 CERVICAL SPONDYLOSIS: Primary | ICD-10-CM

## 2025-05-19 PROCEDURE — 64633 DESTROY CERV/THOR FACET JNT: CPT | Mod: 50 | Performed by: ANESTHESIOLOGY

## 2025-05-19 PROCEDURE — 63600175 PHARM REV CODE 636 W HCPCS: Performed by: ANESTHESIOLOGY

## 2025-05-19 PROCEDURE — 64634 DESTROY C/TH FACET JNT ADDL: CPT | Mod: 50 | Performed by: ANESTHESIOLOGY

## 2025-05-19 PROCEDURE — 64633 DESTROY CERV/THOR FACET JNT: CPT | Mod: 50,,, | Performed by: ANESTHESIOLOGY

## 2025-05-19 PROCEDURE — 64634 DESTROY C/TH FACET JNT ADDL: CPT | Mod: 50,,, | Performed by: ANESTHESIOLOGY

## 2025-05-19 PROCEDURE — 99152 MOD SED SAME PHYS/QHP 5/>YRS: CPT | Performed by: ANESTHESIOLOGY

## 2025-05-19 RX ORDER — FENTANYL CITRATE 50 UG/ML
INJECTION, SOLUTION INTRAMUSCULAR; INTRAVENOUS
Status: DISCONTINUED | OUTPATIENT
Start: 2025-05-19 | End: 2025-05-19 | Stop reason: HOSPADM

## 2025-05-19 RX ORDER — LIDOCAINE HYDROCHLORIDE 10 MG/ML
INJECTION, SOLUTION EPIDURAL; INFILTRATION; INTRACAUDAL; PERINEURAL
Status: DISCONTINUED | OUTPATIENT
Start: 2025-05-19 | End: 2025-05-19 | Stop reason: HOSPADM

## 2025-05-19 RX ORDER — DEXAMETHASONE SODIUM PHOSPHATE 10 MG/ML
INJECTION, SOLUTION INTRA-ARTICULAR; INTRALESIONAL; INTRAMUSCULAR; INTRAVENOUS; SOFT TISSUE
Status: DISCONTINUED | OUTPATIENT
Start: 2025-05-19 | End: 2025-05-19 | Stop reason: HOSPADM

## 2025-05-19 RX ORDER — MIDAZOLAM HYDROCHLORIDE 1 MG/ML
INJECTION INTRAMUSCULAR; INTRAVENOUS
Status: DISCONTINUED | OUTPATIENT
Start: 2025-05-19 | End: 2025-05-19 | Stop reason: HOSPADM

## 2025-05-19 RX ORDER — BUPIVACAINE HYDROCHLORIDE 2.5 MG/ML
INJECTION, SOLUTION EPIDURAL; INFILTRATION; INTRACAUDAL; PERINEURAL
Status: DISCONTINUED | OUTPATIENT
Start: 2025-05-19 | End: 2025-05-19 | Stop reason: HOSPADM

## 2025-05-19 RX ORDER — ONDANSETRON HYDROCHLORIDE 2 MG/ML
4 INJECTION, SOLUTION INTRAVENOUS ONCE AS NEEDED
Status: DISCONTINUED | OUTPATIENT
Start: 2025-05-19 | End: 2025-05-19 | Stop reason: HOSPADM

## 2025-05-19 NOTE — H&P
"HPI  Patient presenting for Procedure(s) (LRB):  Bilateral C4-C6 RFA with RN IV sedation (Bilateral)     Patient on Anti-coagulation No    No health changes since previous encounter    Past Medical History:   Diagnosis Date    Hypertension      Past Surgical History:   Procedure Laterality Date    COLONOSCOPY N/A 03/27/2024    Procedure: COLONOSCOPY;  Surgeon: Tay Mercado MD;  Location: [facility];  Service: General;  Laterality: N/A;    HYSTERECTOMY      INJECTION OF ANESTHETIC AGENT AROUND MEDIAL BRANCH NERVES INNERVATING CERVICAL FACET JOINT Bilateral 3/6/2025    Procedure: bilateral Cervical C4-6 medial branch block;  Surgeon: Rony Rodríguez MD;  Location: [facility];  Service: Pain Management;  Laterality: Bilateral;    INJECTION OF ANESTHETIC AGENT AROUND MEDIAL BRANCH NERVES INNERVATING CERVICAL FACET JOINT Bilateral 4/14/2025    Procedure: Bilateral C4-C6 MBB with RN IV sedation;  Surgeon: Rony Rodríguez MD;  Location: [facility];  Service: Pain Management;  Laterality: Bilateral;    JOINT REPLACEMENT      OOPHORECTOMY Bilateral      Review of patient's allergies indicates:   Allergen Reactions    Shellfish containing products Rash and Swelling     Other Reaction(s): Not available    Iodine      Other Reaction(s): Not available    Tuberculin         Medications Ordered Prior to Encounter[1]     PMHx, PSHx, Allergies, Medications reviewed in epic    ROS negative except pain complaints in HPI    OBJECTIVE:    BP (!) 167/77 (BP Location: Right arm, Patient Position: Sitting)   Pulse 66   Temp 98.2 °F (36.8 °C) (Temporal)   Resp 16   Ht 5' 4" (1.626 m)   Wt 85.2 kg (187 lb 13.3 oz)   LMP  (LMP Unknown)   SpO2 98%   Breastfeeding No   BMI 32.24 kg/m²     PHYSICAL EXAMINATION:    GENERAL: Well appearing, in no acute distress, alert and oriented x3.  PSYCH:  Mood and affect appropriate.  SKIN: Skin color, texture, turgor normal, no rashes or lesions which will impact the " procedure.  CV: RRR with palpation of the radial artery.  PULM: No evidence of respiratory difficulty, symmetric chest rise. Clear to auscultation.  NEURO: Cranial nerves grossly intact.    Plan:    Proceed with procedure as planned Procedure(s) (LRB):  Bilateral C4-C6 RFA with RN IV sedation (Bilateral)    Rony Rodríguez MD  05/19/2025                 [1]   No current facility-administered medications on file prior to encounter.     Current Outpatient Medications on File Prior to Encounter   Medication Sig Dispense Refill    atorvastatin (LIPITOR) 40 MG tablet Take 1 tablet (40 mg total) by mouth once daily. 90 tablet 3    cetirizine (ZYRTEC) 10 MG tablet Take 1 tablet (10 mg total) by mouth once daily. 90 tablet 3    cholecalciferol, vitamin D3, (VITAMIN D3) 50 mcg (2,000 unit) Tab Take 2,000 Units by mouth once daily.      cyanocobalamin (VITAMIN B-12) 1000 MCG tablet Take 1 tablet by mouth once daily.      diclofenac sodium (SOLARAZE) 3 % gel Apply up to 4 times a day. 200 g 11    EPINEPHrine (EPIPEN JR) 0.15 mg/0.3 mL pen injection Inject 0.3 mLs (0.15 mg total) into the muscle as needed for Anaphylaxis. 1 each 11    hydroCHLOROthiazide (HYDRODIURIL) 50 MG tablet Take 0.5 tablets (25 mg total) by mouth once daily. 90 tablet 3    meloxicam (MOBIC) 15 MG tablet Take 1 tablet (15 mg total) by mouth once daily. 30 tablet 11    methocarbamoL (ROBAXIN) 500 MG Tab Take 1 tablet (500 mg total) by mouth 2 (two) times daily as needed (muscle spasms). May cause drowsiness. 60 tablet 2    oxybutynin (DITROPAN-XL) 5 MG TR24 Take 2.5 mg by mouth once daily.      pregabalin (LYRICA) 50 MG capsule TAKE 1 CAPSULE BY MOUTH 2 TIMES DAILY. 60 capsule 1

## 2025-05-19 NOTE — OP NOTE
Cervical Medial nerve branch block radiofrequency ablation Under Fluoroscopy  Bilateral   C4/C5 and C5/C6    INFORMED CONSENT: The procedure, risks, benefits and options were discussed with patient. There are no contraindications to the procedure. The patient expressed understanding and agreed to proceed. The personnel performing the procedure was discussed.    Date of procedure 05/19/2025    Time-out taken to identify patient and procedure side prior to starting the procedure.                     PROCEDURE: Bilateral radiofrequency ablation of the the medial branch nerves at the   transverse process of  C4, C5, and C6    Pre Procedure diagnosis:    Cervical spondylosis [M47.812]  1. Cervical spondylosis    2. Metabolic dysfunction-associated steatotic liver disease (MASLD)        Post-Procedure diagnosis:   same        PHYSICIAN: Rony Rodríguez MD    ASSISTANTS: None     LOCAL ANESTHETIC USED: Lidocaine 1% 1mL / site     ESTIMATED BLOOD LOSS: None.     COMPLICATIONS: None.     Sedation: Conscious sedation provided by M.D    SEDATION MEDICATIONS: local/IV sedation: Versed 2 mg and fentanyl 100 mcg IV.  Conscious sedation ordered by MD.  Patient reevaluated and sedation administered by MD and monitored by RN.    Total sedation time was >20 min but <30min      TECHNIQUE: Laying in a prone position, the patient was prepped and draped in the usual sterile fashion using ChloraPrep and fenestrated drape. The level was determined under fluoroscopic guidance. Local anesthetic was given by going down to the hub of the 27-gauge 1.25in needle and raising a wheel. A 18-gauge 10mm curved active tip needle was introduced to the anatomic local of the medial branch at each of the stated above levels using fluoroscopy. Then sensory and motor testing was performed to confirm that the needle tips were in the correct location. Then after negative aspiration, 1 mL of Lidocaine PF 1% was injected into each level. This was followed by  thermal lesioning at 80 degrees celsius for 90 seconds. After lesioning was complete, 0.5ml of bupivacaine PF 25% and 3mg of decadron PF was injected at each level. The patient tolerated the procedure well.    The patient tolerated the procedure well. Did not have any procedure related motor deficit at the conclusion of the procedure    The patient was monitored for approximately 30 minutes after the procedure.  Patient was given post procedure and discharge instructions to follow at home.  The patient was discharged in a stable condition

## 2025-05-19 NOTE — DISCHARGE SUMMARY
Discharge Note  Short Stay      SUMMARY     Admit Date: 5/19/2025    Attending Physician: Rony Rodríguez MD        Discharge Physician: Rony Rodríguez MD        Discharge Date: 5/19/2025 11:03 AM    Procedure(s) (LRB):  Bilateral C4-C6 RFA with RN IV sedation (Bilateral)    Final Diagnosis: Cervical spondylosis [M47.812]    Disposition: Home or self care    Patient Instructions:   Current Discharge Medication List        CONTINUE these medications which have NOT CHANGED    Details   atorvastatin (LIPITOR) 40 MG tablet Take 1 tablet (40 mg total) by mouth once daily.  Qty: 90 tablet, Refills: 3      cetirizine (ZYRTEC) 10 MG tablet Take 1 tablet (10 mg total) by mouth once daily.  Qty: 90 tablet, Refills: 3      cholecalciferol, vitamin D3, (VITAMIN D3) 50 mcg (2,000 unit) Tab Take 2,000 Units by mouth once daily.      cyanocobalamin (VITAMIN B-12) 1000 MCG tablet Take 1 tablet by mouth once daily.      diclofenac sodium (SOLARAZE) 3 % gel Apply up to 4 times a day.  Qty: 200 g, Refills: 11      EPINEPHrine (EPIPEN JR) 0.15 mg/0.3 mL pen injection Inject 0.3 mLs (0.15 mg total) into the muscle as needed for Anaphylaxis.  Qty: 1 each, Refills: 11      hydroCHLOROthiazide (HYDRODIURIL) 50 MG tablet Take 0.5 tablets (25 mg total) by mouth once daily.  Qty: 90 tablet, Refills: 3    Comments: .      meloxicam (MOBIC) 15 MG tablet Take 1 tablet (15 mg total) by mouth once daily.  Qty: 30 tablet, Refills: 11      methocarbamoL (ROBAXIN) 500 MG Tab Take 1 tablet (500 mg total) by mouth 2 (two) times daily as needed (muscle spasms). May cause drowsiness.  Qty: 60 tablet, Refills: 2    Associated Diagnoses: Pelvic and perineal pain; Sacral insufficiency fracture, initial encounter      oxybutynin (DITROPAN-XL) 5 MG TR24 Take 2.5 mg by mouth once daily.      pregabalin (LYRICA) 50 MG capsule TAKE 1 CAPSULE BY MOUTH 2 TIMES DAILY.  Qty: 60 capsule, Refills: 1    Comments: Not to exceed 4 additional fills before 03/17/2025  DX Code Needed  .  Associated Diagnoses: Left sided sciatica; Lumbar radiculopathy; Sacroiliitis; Lumbar spondylosis; DDD (degenerative disc disease), lumbar; Dorsalgia, unspecified; Lumbar radiculopathy, chronic                 Discharge Diagnosis: Cervical spondylosis [M47.812]  Condition on Discharge: Stable with no complications to procedure   Diet on Discharge: Same as before.  Activity: as per instruction sheet.  Discharge to: Home with a responsible adult.  Follow up: 2-4 weeks       Please call the office at [phone] if you experience any weakness or loss of sensation, fever > 101.5, pain uncontrolled with oral medications, persistent nausea/vomiting/or diarrhea, redness or drainage from the incisions, or any other worrisome concerns. If physician on call was not reached or could not communicate with our office for any reason please go to the nearest emergency department

## 2025-05-19 NOTE — DISCHARGE INSTRUCTIONS

## 2025-07-21 ENCOUNTER — TELEPHONE (OUTPATIENT)
Dept: HEPATOLOGY | Facility: CLINIC | Age: 70
End: 2025-07-21
Payer: OTHER GOVERNMENT

## 2025-07-21 NOTE — TELEPHONE ENCOUNTER
Returned call and informed that we have no availability on July 28th.  Rescheduled her to a new date.

## 2025-07-21 NOTE — TELEPHONE ENCOUNTER
Copied from CRM #[id number]. Topic: Appointments - Appointment Access  >> Jul 21, 2025 11:51 AM Haydee wrote:  Would like to receive medical advice.    Would they like a call back or a response via [facility]:  call back [phone] or portal    Additional information:  Pt is calling to see if her Fibroscan can be rescheduled.  She would like it to be scheduled on 07/28/25 if possible.

## 2025-08-08 ENCOUNTER — HOSPITAL ENCOUNTER (OUTPATIENT)
Dept: RADIOLOGY | Facility: HOSPITAL | Age: 70
Discharge: HOME | End: 2025-08-08
Attending: PEDIATRICS
Payer: MEDICARE

## 2025-08-08 ENCOUNTER — OFFICE VISIT (OUTPATIENT)
Dept: INTERNAL MEDICINE | Facility: CLINIC | Age: 70
End: 2025-08-08
Payer: MEDICARE

## 2025-08-08 VITALS
HEART RATE: 68 BPM | DIASTOLIC BLOOD PRESSURE: 76 MMHG | OXYGEN SATURATION: 98 % | TEMPERATURE: 98 F | SYSTOLIC BLOOD PRESSURE: 116 MMHG | HEIGHT: 64 IN | BODY MASS INDEX: 30.9 KG/M2 | WEIGHT: 181 LBS

## 2025-08-08 DIAGNOSIS — M10.9 ACUTE GOUT INVOLVING TOE OF RIGHT FOOT, UNSPECIFIED CAUSE: Primary | ICD-10-CM

## 2025-08-08 DIAGNOSIS — M10.9 ACUTE GOUT INVOLVING TOE OF RIGHT FOOT, UNSPECIFIED CAUSE: ICD-10-CM

## 2025-08-08 PROCEDURE — 73630 X-RAY EXAM OF FOOT: CPT | Mod: 26,RT,, | Performed by: RADIOLOGY

## 2025-08-08 PROCEDURE — 73630 X-RAY EXAM OF FOOT: CPT | Mod: TC,RT

## 2025-08-08 PROCEDURE — 99999 PR PBB SHADOW E&M-EST. PATIENT-LVL III: CPT | Mod: PBBFAC,,, | Performed by: PEDIATRICS

## 2025-08-08 PROCEDURE — 99213 OFFICE O/P EST LOW 20 MIN: CPT | Mod: PBBFAC,25 | Performed by: PEDIATRICS

## 2025-08-08 RX ORDER — COLCHICINE 0.6 MG/1
1 CAPSULE ORAL DAILY
Qty: 30 CAPSULE | Refills: 0 | Status: SHIPPED | OUTPATIENT
Start: 2025-08-08 | End: 2025-09-07

## 2025-08-08 RX ORDER — INDOMETHACIN 50 MG/1
50 CAPSULE ORAL 2 TIMES DAILY WITH MEALS
Qty: 12 CAPSULE | Refills: 1 | Status: SHIPPED | OUTPATIENT
Start: 2025-08-08

## 2025-08-08 RX ORDER — FERROUS SULFATE 325(65) MG
325 TABLET ORAL DAILY
COMMUNITY
Start: 2025-02-05

## 2025-08-08 RX ORDER — GABAPENTIN 300 MG/1
300 CAPSULE ORAL NIGHTLY
COMMUNITY
Start: 2025-01-13

## 2025-08-08 NOTE — PROGRESS NOTES
Patient ID: Pamela Lancaster is a 69 y.o. female.    Chief Complaint: Foot Pain (Right)    History of Present Illness    CHIEF COMPLAINT:  Patient presents today with foot pain concerning for gout.    FOOT PAIN:  She reports pain localized to the top of the foot and base of toes. She denies any history of trauma to the foot or prior history of gout. She is uncertain about the cause of her foot pain.    DIET:  She denies consuming most gout-triggering foods. Her diet primarily consists of cheese sandwiches, including melted cheese and cream cheese. She rarely consumes meat. She reports drinking significant amounts of orange juice.    CURRENT MEDICATIONS:  She is currently taking meloxicam, for osteoarthritis. She was instructed to discontinue this medication during this treatment course.    PMH, PSH, SH, FH reviewed with patient.      ROS:  General: -fever, -chills, -fatigue, -weight gain, -weight loss  Eyes: -vision changes, -redness, -discharge  ENT: -ear pain, -nasal congestion, -sore throat  Cardiovascular: -chest pain, -palpitations, +lower extremity edema  Respiratory: -cough, -shortness of breath  Gastrointestinal: -abdominal pain, -nausea, -vomiting, -diarrhea, -constipation, -blood in stool  Genitourinary: -dysuria, -hematuria, -frequency  Musculoskeletal: -joint pain, -muscle pain, +limb pain, +limb swelling  Skin: -rash, -lesion  Neurological: -headache, -dizziness, -numbness, -tingling  Psychiatric: -anxiety, -depression, -sleep difficulty         Exam:  Physical Exam    General: No acute distress. Well-developed. Well-nourished.  Eyes: EOMI. Sclerae anicteric.  HENT: Normocephalic. Atraumatic. Nares patent. Moist oral mucosa.  Cardiovascular: Regular rate. Regular rhythm. No murmurs. No rubs. No gallops. Normal S1, S2.  Respiratory: Normal respiratory effort. Clear to auscultation bilaterally. No rales. No rhonchi. No wheezing.  Abdomen: Soft. Non-tender. Non-distended. Normoactive bowel  sounds.  Musculoskeletal: No  obvious deformity.  Extremities: No lower extremity edema.  Neurological: Alert & oriented x3. No slurred speech. Normal gait.  Psychiatric: Normal mood. Normal affect. Good insight. Good judgment.  Skin: Warm. Dry. No rash.  MSK: Foot/Ankle - Right: First toe tenderness (Right). Dorsal tenderness (Right). Erythema at base of right great toe. Foot edema (Right). Excessive warmth of foot (Right). No ankle effusions. Dorsalis pedis pulses intact. No discrete skin lesions.         Assessment/Plan:  Acute gout involving toe of right foot, unspecified cause  -     X-Ray Foot Complete 3 view Right; Future; Expected date: 08/08/2025  -     Uric Acid; Future; Expected date: 08/08/2025  -     indomethacin (INDOCIN) 50 MG capsule; Take 1 capsule (50 mg total) by mouth 2 (two) times daily with meals. HOLD MELOXICAM WHILE ON INDOMETHACIN  Dispense: 12 capsule; Refill: 1  -     colchicine (MITIGARE) 0.6 mg Cap; Take 1 capsule (0.6 mg total) by mouth once daily.  Dispense: 30 capsule; Refill: 0         Assessment & Plan    IMPRESSION:  - Considered gout vs. tendinitis based on exquisitely tender, red, warm, and swollen right great toe base and dorsum of foot.  - Initiated empiric gout treatment due to weekend timing of indomethacin(hold meloxicam), and colchicine, pending lab results.  - Will review lab results on Monday to determine if long-term gout treatment is necessary with allopurinol.  - If gout is not confirmed, may need to consider referral to orthopedics or rheumatology for joint aspiration and crystal analysis.    GOUT, RIGHT FOOT:  - Monitored patient's right great toe base and dorsum of the foot, which is exquisitely tender, slightly red, warm, and swollen, consistent with gout.  - Explained gout pathophysiology, including uric acid crystal precipitation in joints and potential causes such as high-purine foods (red meat, organ meats, shellfish), alcohol, and fructose-containing  beverages.    LONG-TERM USE OF NSAIDS:  - Noted patient was previously on meloxicam.  - Advised to discontinue meloxicam while taking indomethacin as they belong to the same class of medication (NSAIDs).          Visit today included increased complexity associated with the care of the episodic problem  addressed and managing the longitudinal care of the patient due to the serious and/or complex managed problem(s) .      No follow-ups on file.    This note was generated with the assistance of ambient listening technology. Verbal consent was obtained by the patient and accompanying visitor(s) for the recording of patient appointment to facilitate this note. I attest to having reviewed and edited the generated note for accuracy, though some syntax or spelling errors may persist. Please contact the author of this note for any clarification.

## 2025-08-18 ENCOUNTER — HOSPITAL ENCOUNTER (OUTPATIENT)
Dept: RADIOLOGY | Facility: HOSPITAL | Age: 70
Discharge: HOME | End: 2025-08-18
Attending: PODIATRIST
Payer: MEDICARE

## 2025-08-18 ENCOUNTER — OFFICE VISIT (OUTPATIENT)
Dept: PODIATRY | Facility: CLINIC | Age: 70
End: 2025-08-18
Payer: MEDICARE

## 2025-08-18 DIAGNOSIS — S92.324A NONDISPLACED FRACTURE OF SECOND METATARSAL BONE, RIGHT FOOT, INITIAL ENCOUNTER FOR CLOSED FRACTURE: ICD-10-CM

## 2025-08-18 DIAGNOSIS — M79.671 ACUTE PAIN OF RIGHT FOOT: ICD-10-CM

## 2025-08-18 DIAGNOSIS — M85.80 OSTEOPENIA, UNSPECIFIED LOCATION: ICD-10-CM

## 2025-08-18 DIAGNOSIS — M79.671 ACUTE PAIN OF RIGHT FOOT: Primary | ICD-10-CM

## 2025-08-18 PROCEDURE — 99999 PR PBB SHADOW E&M-EST. PATIENT-LVL III: CPT | Mod: PBBFAC,,, | Performed by: PODIATRIST

## 2025-08-18 PROCEDURE — 99213 OFFICE O/P EST LOW 20 MIN: CPT | Mod: PBBFAC,25 | Performed by: PODIATRIST

## 2025-08-18 PROCEDURE — 73630 X-RAY EXAM OF FOOT: CPT | Mod: 26,RT,, | Performed by: STUDENT IN AN ORGANIZED HEALTH CARE EDUCATION/TRAINING PROGRAM

## 2025-08-18 PROCEDURE — 99204 OFFICE O/P NEW MOD 45 MIN: CPT | Mod: S$PBB,,, | Performed by: PODIATRIST

## 2025-08-18 PROCEDURE — 73630 X-RAY EXAM OF FOOT: CPT | Mod: TC,RT
